# Patient Record
Sex: MALE | Race: WHITE | NOT HISPANIC OR LATINO | URBAN - METROPOLITAN AREA
[De-identification: names, ages, dates, MRNs, and addresses within clinical notes are randomized per-mention and may not be internally consistent; named-entity substitution may affect disease eponyms.]

---

## 2017-06-22 ENCOUNTER — INPATIENT (INPATIENT)
Facility: HOSPITAL | Age: 77
LOS: 0 days | Discharge: HOME | End: 2017-06-23
Attending: SURGERY | Admitting: SURGERY
Payer: MEDICARE

## 2017-06-22 VITALS — WEIGHT: 162.92 LBS | HEIGHT: 66 IN

## 2017-06-22 DIAGNOSIS — I77.4 CELIAC ARTERY COMPRESSION SYNDROME: Chronic | ICD-10-CM

## 2017-06-22 LAB
ALBUMIN SERPL ELPH-MCNC: 4 G/DL — SIGNIFICANT CHANGE UP (ref 3.3–5)
ALP SERPL-CCNC: 74 U/L — SIGNIFICANT CHANGE UP (ref 40–120)
ALT FLD-CCNC: 66 U/L — SIGNIFICANT CHANGE UP (ref 12–78)
ANION GAP SERPL CALC-SCNC: 7 MMOL/L — SIGNIFICANT CHANGE UP (ref 5–17)
APTT BLD: 27.9 SEC — SIGNIFICANT CHANGE UP (ref 27.5–37.4)
AST SERPL-CCNC: 44 U/L — HIGH (ref 15–37)
BASOPHILS # BLD AUTO: 0.1 K/UL — SIGNIFICANT CHANGE UP (ref 0–0.2)
BASOPHILS NFR BLD AUTO: 0.5 % — SIGNIFICANT CHANGE UP (ref 0–2)
BILIRUB SERPL-MCNC: 0.5 MG/DL — SIGNIFICANT CHANGE UP (ref 0.2–1.2)
BLD GP AB SCN SERPL QL: SIGNIFICANT CHANGE UP
BUN SERPL-MCNC: 26 MG/DL — HIGH (ref 7–23)
CALCIUM SERPL-MCNC: 8.8 MG/DL — SIGNIFICANT CHANGE UP (ref 8.5–10.1)
CHLORIDE SERPL-SCNC: 101 MMOL/L — SIGNIFICANT CHANGE UP (ref 96–108)
CO2 SERPL-SCNC: 31 MMOL/L — SIGNIFICANT CHANGE UP (ref 22–31)
CREAT SERPL-MCNC: 1.26 MG/DL — SIGNIFICANT CHANGE UP (ref 0.5–1.3)
EOSINOPHIL # BLD AUTO: 0.1 K/UL — SIGNIFICANT CHANGE UP (ref 0–0.5)
EOSINOPHIL NFR BLD AUTO: 0.5 % — SIGNIFICANT CHANGE UP (ref 0–6)
GLUCOSE SERPL-MCNC: 138 MG/DL — HIGH (ref 70–99)
HCT VFR BLD CALC: 45.9 % — SIGNIFICANT CHANGE UP (ref 39–50)
HGB BLD-MCNC: 15.9 G/DL — SIGNIFICANT CHANGE UP (ref 13–17)
INR BLD: 1 RATIO — SIGNIFICANT CHANGE UP (ref 0.88–1.16)
LIDOCAIN IGE QN: 102 U/L — SIGNIFICANT CHANGE UP (ref 73–393)
LYMPHOCYTES # BLD AUTO: 17.1 % — SIGNIFICANT CHANGE UP (ref 13–44)
LYMPHOCYTES # BLD AUTO: 2.5 K/UL — SIGNIFICANT CHANGE UP (ref 1–3.3)
MCHC RBC-ENTMCNC: 31.4 PG — SIGNIFICANT CHANGE UP (ref 27–34)
MCHC RBC-ENTMCNC: 34.6 GM/DL — SIGNIFICANT CHANGE UP (ref 32–36)
MCV RBC AUTO: 90.7 FL — SIGNIFICANT CHANGE UP (ref 80–100)
MONOCYTES # BLD AUTO: 0.8 K/UL — SIGNIFICANT CHANGE UP (ref 0–0.9)
MONOCYTES NFR BLD AUTO: 5.6 % — SIGNIFICANT CHANGE UP (ref 2–14)
NEUTROPHILS # BLD AUTO: 11.1 K/UL — HIGH (ref 1.8–7.4)
NEUTROPHILS NFR BLD AUTO: 76.4 % — SIGNIFICANT CHANGE UP (ref 43–77)
PLATELET # BLD AUTO: 237 K/UL — SIGNIFICANT CHANGE UP (ref 150–400)
POTASSIUM SERPL-MCNC: 3.3 MMOL/L — LOW (ref 3.5–5.3)
POTASSIUM SERPL-SCNC: 3.3 MMOL/L — LOW (ref 3.5–5.3)
PROT SERPL-MCNC: 7.4 GM/DL — SIGNIFICANT CHANGE UP (ref 6–8.3)
PROTHROM AB SERPL-ACNC: 10.8 SEC — SIGNIFICANT CHANGE UP (ref 9.8–12.7)
RBC # BLD: 5.06 M/UL — SIGNIFICANT CHANGE UP (ref 4.2–5.8)
RBC # FLD: 11.9 % — SIGNIFICANT CHANGE UP (ref 10.3–14.5)
SODIUM SERPL-SCNC: 139 MMOL/L — SIGNIFICANT CHANGE UP (ref 135–145)
TYPE + AB SCN PNL BLD: SIGNIFICANT CHANGE UP
WBC # BLD: 14.5 K/UL — HIGH (ref 3.8–10.5)
WBC # FLD AUTO: 14.5 K/UL — HIGH (ref 3.8–10.5)

## 2017-06-22 PROCEDURE — 93010 ELECTROCARDIOGRAM REPORT: CPT

## 2017-06-22 PROCEDURE — 74022 RADEX COMPL AQT ABD SERIES: CPT | Mod: 26

## 2017-06-22 PROCEDURE — 74177 CT ABD & PELVIS W/CONTRAST: CPT | Mod: 26

## 2017-06-22 PROCEDURE — 99285 EMERGENCY DEPT VISIT HI MDM: CPT

## 2017-06-22 RX ORDER — METOPROLOL TARTRATE 50 MG
5 TABLET ORAL EVERY 6 HOURS
Qty: 0 | Refills: 0 | Status: DISCONTINUED | OUTPATIENT
Start: 2017-06-22 | End: 2017-06-23

## 2017-06-22 RX ORDER — HYDROMORPHONE HYDROCHLORIDE 2 MG/ML
0.5 INJECTION INTRAMUSCULAR; INTRAVENOUS; SUBCUTANEOUS ONCE
Qty: 0 | Refills: 0 | Status: DISCONTINUED | OUTPATIENT
Start: 2017-06-22 | End: 2017-06-22

## 2017-06-22 RX ORDER — METOPROLOL TARTRATE 50 MG
1 TABLET ORAL
Qty: 0 | Refills: 0 | COMMUNITY

## 2017-06-22 RX ORDER — ONDANSETRON 8 MG/1
4 TABLET, FILM COATED ORAL ONCE
Qty: 0 | Refills: 0 | Status: DISCONTINUED | OUTPATIENT
Start: 2017-06-22 | End: 2017-06-22

## 2017-06-22 RX ORDER — HEPARIN SODIUM 5000 [USP'U]/ML
5000 INJECTION INTRAVENOUS; SUBCUTANEOUS EVERY 12 HOURS
Qty: 0 | Refills: 0 | Status: DISCONTINUED | OUTPATIENT
Start: 2017-06-22 | End: 2017-06-23

## 2017-06-22 RX ORDER — AMLODIPINE BESYLATE 2.5 MG/1
1 TABLET ORAL
Qty: 0 | Refills: 0 | COMMUNITY

## 2017-06-22 RX ORDER — CLOPIDOGREL BISULFATE 75 MG/1
1 TABLET, FILM COATED ORAL
Qty: 0 | Refills: 0 | COMMUNITY

## 2017-06-22 RX ORDER — RANITIDINE HYDROCHLORIDE 150 MG/1
1 TABLET, FILM COATED ORAL
Qty: 0 | Refills: 0 | COMMUNITY

## 2017-06-22 RX ORDER — POTASSIUM CHLORIDE 20 MEQ
10 PACKET (EA) ORAL
Qty: 0 | Refills: 0 | Status: COMPLETED | OUTPATIENT
Start: 2017-06-22 | End: 2017-06-22

## 2017-06-22 RX ORDER — ONDANSETRON 8 MG/1
4 TABLET, FILM COATED ORAL ONCE
Qty: 0 | Refills: 0 | Status: COMPLETED | OUTPATIENT
Start: 2017-06-22 | End: 2017-06-22

## 2017-06-22 RX ORDER — SODIUM CHLORIDE 9 MG/ML
1000 INJECTION INTRAMUSCULAR; INTRAVENOUS; SUBCUTANEOUS ONCE
Qty: 0 | Refills: 0 | Status: COMPLETED | OUTPATIENT
Start: 2017-06-22 | End: 2017-06-22

## 2017-06-22 RX ORDER — EZETIMIBE 10 MG/1
1 TABLET ORAL
Qty: 0 | Refills: 0 | COMMUNITY

## 2017-06-22 RX ORDER — PITAVASTATIN CALCIUM 1.04 MG/1
1 TABLET, FILM COATED ORAL
Qty: 0 | Refills: 0 | COMMUNITY

## 2017-06-22 RX ORDER — MORPHINE SULFATE 50 MG/1
2 CAPSULE, EXTENDED RELEASE ORAL EVERY 4 HOURS
Qty: 0 | Refills: 0 | Status: DISCONTINUED | OUTPATIENT
Start: 2017-06-22 | End: 2017-06-23

## 2017-06-22 RX ORDER — VALSARTAN 80 MG/1
1 TABLET ORAL
Qty: 0 | Refills: 0 | COMMUNITY

## 2017-06-22 RX ORDER — PANTOPRAZOLE SODIUM 20 MG/1
40 TABLET, DELAYED RELEASE ORAL DAILY
Qty: 0 | Refills: 0 | Status: DISCONTINUED | OUTPATIENT
Start: 2017-06-22 | End: 2017-06-23

## 2017-06-22 RX ORDER — ASPIRIN/CALCIUM CARB/MAGNESIUM 324 MG
1 TABLET ORAL
Qty: 0 | Refills: 0 | COMMUNITY

## 2017-06-22 RX ORDER — AMITRIPTYLINE HCL 25 MG
1 TABLET ORAL
Qty: 0 | Refills: 0 | COMMUNITY

## 2017-06-22 RX ORDER — DEXTROSE MONOHYDRATE, SODIUM CHLORIDE, AND POTASSIUM CHLORIDE 50; .745; 4.5 G/1000ML; G/1000ML; G/1000ML
1000 INJECTION, SOLUTION INTRAVENOUS
Qty: 0 | Refills: 0 | Status: DISCONTINUED | OUTPATIENT
Start: 2017-06-22 | End: 2017-06-23

## 2017-06-22 RX ADMIN — ONDANSETRON 4 MILLIGRAM(S): 8 TABLET, FILM COATED ORAL at 15:00

## 2017-06-22 RX ADMIN — HYDROMORPHONE HYDROCHLORIDE 0.5 MILLIGRAM(S): 2 INJECTION INTRAMUSCULAR; INTRAVENOUS; SUBCUTANEOUS at 17:30

## 2017-06-22 RX ADMIN — Medication 5 MILLIGRAM(S): at 23:18

## 2017-06-22 RX ADMIN — MORPHINE SULFATE 2 MILLIGRAM(S): 50 CAPSULE, EXTENDED RELEASE ORAL at 19:53

## 2017-06-22 RX ADMIN — HYDROMORPHONE HYDROCHLORIDE 0.5 MILLIGRAM(S): 2 INJECTION INTRAMUSCULAR; INTRAVENOUS; SUBCUTANEOUS at 15:30

## 2017-06-22 RX ADMIN — HYDROMORPHONE HYDROCHLORIDE 0.5 MILLIGRAM(S): 2 INJECTION INTRAMUSCULAR; INTRAVENOUS; SUBCUTANEOUS at 16:16

## 2017-06-22 RX ADMIN — HYDROMORPHONE HYDROCHLORIDE 0.5 MILLIGRAM(S): 2 INJECTION INTRAMUSCULAR; INTRAVENOUS; SUBCUTANEOUS at 18:00

## 2017-06-22 RX ADMIN — Medication 100 MILLIEQUIVALENT(S): at 23:18

## 2017-06-22 RX ADMIN — HYDROMORPHONE HYDROCHLORIDE 0.5 MILLIGRAM(S): 2 INJECTION INTRAMUSCULAR; INTRAVENOUS; SUBCUTANEOUS at 16:46

## 2017-06-22 RX ADMIN — DEXTROSE MONOHYDRATE, SODIUM CHLORIDE, AND POTASSIUM CHLORIDE 100 MILLILITER(S): 50; .745; 4.5 INJECTION, SOLUTION INTRAVENOUS at 22:38

## 2017-06-22 RX ADMIN — HYDROMORPHONE HYDROCHLORIDE 0.5 MILLIGRAM(S): 2 INJECTION INTRAMUSCULAR; INTRAVENOUS; SUBCUTANEOUS at 15:00

## 2017-06-22 RX ADMIN — SODIUM CHLORIDE 2000 MILLILITER(S): 9 INJECTION INTRAMUSCULAR; INTRAVENOUS; SUBCUTANEOUS at 15:00

## 2017-06-22 NOTE — ED STATDOCS - PROGRESS NOTE DETAILS
KEY Mann: Patient has been seen, evaluated and orders have been written by the attending in intake. Patient is stable.  I will follow up the results of orders written and I will continue to evaluate/observe the patient. KEY Mann: pt c/o returning pain, will order more pain meds KEY Mann: dr frias called ed and spoke with dr coleman. requests us to call dr montes de oca (sx) about abdominal xray results showing ileus vs. sbo. dr montes de oca paged KEY Mann: dr montes de oca aware of consult

## 2017-06-22 NOTE — H&P ADULT - ASSESSMENT
77 y/o with SBO  NPO, IVF  -NGtube  -Serial abdominal exam  -f/U ABX in AM  - f/u Lab AM  -above plan discussed with Dr. montes de oca

## 2017-06-22 NOTE — ED STATDOCS - MEDICAL DECISION MAKING DETAILS
76yr old male, middle abdomen pain, appears very uncomfortable, concern for bowel obstruction given pts history, plan for screening XR for free air, CT, labs, preop w/u

## 2017-06-22 NOTE — ED ADULT NURSE NOTE - OBJECTIVE STATEMENT
Pt reports abdominal pain since this AM, no N/V/D, relief with belching. Denies any urinary F/U/D. Pt states "I was out playing a round of golf and at the 5th hole, I had Pt reports abdominal pain since this AM, no N/V/D, relief with belching. Denies any urinary F/U/D. Pt states "I was out playing a round of golf and at the 5th hole, I had to come in, I have had a partial bowel obstruction in the past"

## 2017-06-22 NOTE — ED STATDOCS - ATTENDING CONTRIBUTION TO CARE
I, Kevin Schreiber, performed the initial face to face bedside interview with this patient regarding history of present illness, review of symptoms and relevant past medical, social and family history.  I completed an independent physical examination.  I was the initial provider who evaluated this patient. I have signed out the follow up of any pending tests (i.e. labs, radiological studies) to the ACP.  I have communicated the patient’s plan of care and disposition with the ACP.  The history, relevant review of systems, past medical and surgical history, medical decision making, and physical examination was documented by the scribe in my presence and I attest to the accuracy of the documentation.

## 2017-06-22 NOTE — H&P ADULT - HISTORY OF PRESENT ILLNESS
75 yo M h/o partial bowel obstruction (self resolved), transurethral prostate resection, hernias, sent in from PCP Dr. Mathew office for intermittent, worsening abd pain since this morning. Pt reports some discomfort last night, had eggs and guevara on a bagel with coffee this morning, played golf from 8-11am during which the discomfort and pain gradually increased. Pt states the pain would build up, go away, and come back. +Radiation to lower back, abd distension. Denies n/v, dysuria, frequency. Last normal BM this morning.

## 2017-06-22 NOTE — ED STATDOCS - CONSTITUTIONAL, MLM
normal... well appearing, well nourished, mild distress secondary to pain. Pt unable to find a comfortable position.

## 2017-06-22 NOTE — ED ADULT NURSE REASSESSMENT NOTE - COMFORT CARE
repositioned/side rails up/before being taken to unit, patient was given a blanket and tissue and pillow. hourly rounding completed
warm blanket provided/plan of care explained/wait time explained

## 2017-06-22 NOTE — H&P ADULT - NSHPLABSRESULTS_GEN_ALL_CORE
EXAM:  CT ABDOMEN AND PELVIS OC IC          Small bowel obstruction with transition zone in the right pelvis   involving the distal ileum just proximal to the terminal ileum.

## 2017-06-22 NOTE — ED STATDOCS - GASTROINTESTINAL, MLM
abdomen soft, + and non-distended. Bowel sounds present. No cva tenderness. abdomen soft, +tenderness to umbilicus without mass, mildly distended, +tympanic. Bowel sounds present. No cva tenderness.

## 2017-06-22 NOTE — H&P ADULT - NSHPREVIEWOFSYSTEMS_GEN_ALL_CORE
· CONSTITUTIONAL: no fever and no chills.  · CARDIOVASCULAR: normal rate and rhythm, no chest pain and no edema.  · RESPIRATORY: no chest pain, no cough, and no shortness of breath.  · GASTROINTESTINAL:  · Gastrointestinal [+]: ABDOMINAL PAIN, distension  · Gastrointestinal [-]: no nausea  · MUSCULOSKELETAL: no back pain, no gout, no musculoskeletal pain, no neck pain, and no weakness.  · ROS STATEMENT: all other ROS negative except as per HPI

## 2017-06-22 NOTE — H&P ADULT - NSHPPHYSICALEXAM_GEN_ALL_CORE
· CONSTITUTIONAL: well appearing, well nourished, mild distress secondary to pain. Pt unable to find a comfortable position.  · EYES: clear bilaterally.  Pupils equal, round, and reactive to light.  · ENMT: Nasal mucosa clear.  Mouth with normal mucosa  Throat has no vesicles, no oropharyngeal exudates and uvula is midline.  · CARDIAC: normal rate, regular rhythm, and no murmur.  · RESPIRATORY: breath sounds clear and equal bilaterally.  · GASTROINTESTINAL: abdomen soft, +tenderness to umbilicus without mass, mildly distended, +tympanic. Bowel sounds present. No cva tenderness.  · MUSCULOSKELETAL: range of motion is not limited and there is no muscle tenderness.  · NEUROLOGICAL: sensation is normal and strength is normal.  · SKIN: skin normal color for race, warm, dry and intact.

## 2017-06-23 VITALS — DIASTOLIC BLOOD PRESSURE: 55 MMHG | SYSTOLIC BLOOD PRESSURE: 121 MMHG | HEART RATE: 69 BPM

## 2017-06-23 LAB
ANION GAP SERPL CALC-SCNC: 5 MMOL/L — SIGNIFICANT CHANGE UP (ref 5–17)
APPEARANCE UR: CLEAR — SIGNIFICANT CHANGE UP
BILIRUB UR-MCNC: NEGATIVE — SIGNIFICANT CHANGE UP
BUN SERPL-MCNC: 17 MG/DL — SIGNIFICANT CHANGE UP (ref 7–23)
CALCIUM SERPL-MCNC: 7.9 MG/DL — LOW (ref 8.5–10.1)
CHLORIDE SERPL-SCNC: 104 MMOL/L — SIGNIFICANT CHANGE UP (ref 96–108)
CO2 SERPL-SCNC: 30 MMOL/L — SIGNIFICANT CHANGE UP (ref 22–31)
COLOR SPEC: YELLOW — SIGNIFICANT CHANGE UP
CREAT SERPL-MCNC: 0.85 MG/DL — SIGNIFICANT CHANGE UP (ref 0.5–1.3)
DIFF PNL FLD: (no result)
GLUCOSE SERPL-MCNC: 130 MG/DL — HIGH (ref 70–99)
GLUCOSE UR QL: NEGATIVE MG/DL — SIGNIFICANT CHANGE UP
HCT VFR BLD CALC: 43.5 % — SIGNIFICANT CHANGE UP (ref 39–50)
HGB BLD-MCNC: 14.7 G/DL — SIGNIFICANT CHANGE UP (ref 13–17)
KETONES UR-MCNC: NEGATIVE — SIGNIFICANT CHANGE UP
LEUKOCYTE ESTERASE UR-ACNC: NEGATIVE — SIGNIFICANT CHANGE UP
MCHC RBC-ENTMCNC: 30.8 PG — SIGNIFICANT CHANGE UP (ref 27–34)
MCHC RBC-ENTMCNC: 33.8 GM/DL — SIGNIFICANT CHANGE UP (ref 32–36)
MCV RBC AUTO: 91.2 FL — SIGNIFICANT CHANGE UP (ref 80–100)
NITRITE UR-MCNC: NEGATIVE — SIGNIFICANT CHANGE UP
PH UR: 7 — SIGNIFICANT CHANGE UP (ref 5–8)
PLATELET # BLD AUTO: 198 K/UL — SIGNIFICANT CHANGE UP (ref 150–400)
POTASSIUM SERPL-MCNC: 3.8 MMOL/L — SIGNIFICANT CHANGE UP (ref 3.5–5.3)
POTASSIUM SERPL-SCNC: 3.8 MMOL/L — SIGNIFICANT CHANGE UP (ref 3.5–5.3)
PROT UR-MCNC: NEGATIVE MG/DL — SIGNIFICANT CHANGE UP
RBC # BLD: 4.77 M/UL — SIGNIFICANT CHANGE UP (ref 4.2–5.8)
RBC # FLD: 12.5 % — SIGNIFICANT CHANGE UP (ref 10.3–14.5)
RBC CASTS # UR COMP ASSIST: (no result) /HPF (ref 0–4)
SODIUM SERPL-SCNC: 139 MMOL/L — SIGNIFICANT CHANGE UP (ref 135–145)
SP GR SPEC: 1 — LOW (ref 1.01–1.02)
UROBILINOGEN FLD QL: NEGATIVE MG/DL — SIGNIFICANT CHANGE UP
WBC # BLD: 8.9 K/UL — SIGNIFICANT CHANGE UP (ref 3.8–10.5)
WBC # FLD AUTO: 8.9 K/UL — SIGNIFICANT CHANGE UP (ref 3.8–10.5)
WBC UR QL: NEGATIVE — SIGNIFICANT CHANGE UP

## 2017-06-23 PROCEDURE — 74022 RADEX COMPL AQT ABD SERIES: CPT | Mod: 26

## 2017-06-23 PROCEDURE — 93010 ELECTROCARDIOGRAM REPORT: CPT

## 2017-06-23 RX ADMIN — PANTOPRAZOLE SODIUM 40 MILLIGRAM(S): 20 TABLET, DELAYED RELEASE ORAL at 12:49

## 2017-06-23 RX ADMIN — Medication 100 MILLIEQUIVALENT(S): at 00:46

## 2017-06-23 RX ADMIN — Medication 5 MILLIGRAM(S): at 12:49

## 2017-06-23 RX ADMIN — HEPARIN SODIUM 5000 UNIT(S): 5000 INJECTION INTRAVENOUS; SUBCUTANEOUS at 05:35

## 2017-06-23 RX ADMIN — Medication 100 MILLIEQUIVALENT(S): at 01:59

## 2017-06-23 NOTE — PROGRESS NOTE ADULT - SUBJECTIVE AND OBJECTIVE BOX
Subjective: HD#1    Objective: sbo    Heent: N/C, AT PER no scleral icterus, No JVD  Pul: clear  Cor: RRR  Abdomen: soft, normal BS.   Extremities: without edema, motor/sensory intact    06-22    139  |  101  |  26<H>  ----------------------------<  138<H>  3.3<L>   |  31  |  1.26    Ca    8.8      22 Jun 2017 14:56    TPro  7.4  /  Alb  4.0  /  TBili  0.5  /  DBili  x   /  AST  44<H>  /  ALT  66  /  AlkPhos  74  06-22                            15.9   14.5  )-----------( 237      ( 22 Jun 2017 14:56 )             45.9

## 2017-06-23 NOTE — ASU DISCHARGE PLAN (ADULT/PEDIATRIC). - COMMENTS
Unable to print discharge instruction on [facility], instructions printed by Jackelyn Melara RN from [facility]. Discharging nurse is Honey Estrella RN

## 2017-06-23 NOTE — ASU DISCHARGE PLAN (ADULT/PEDIATRIC). - MEDICATION SUMMARY - MEDICATIONS TO TAKE
I will START or STAY ON the medications listed below when I get home from the hospital:    aspirin 81 mg oral tablet  -- 1 tab(s) by mouth once a day  -- Indication: For Essential hypertension    valsartan 320 mg oral tablet  -- 1 tab(s) by mouth once a day  -- Indication: For Essential hypertension    amitriptyline 10 mg oral tablet  -- 1 tab(s) by mouth once a day (at bedtime)  -- Indication: For Essential hypertension    Livalo 2 mg oral tablet  -- 1 tab(s) by mouth once a day (at bedtime)  -- Indication: For Essential hypertension    Zetia 10 mg oral tablet  -- 1 tab(s) by mouth once a day (at bedtime)  -- Indication: For Essential hypertension    Plavix 75 mg oral tablet  -- 1 tab(s) by mouth once a day  -- Indication: For Essential hypertension    metoprolol tartrate 25 mg oral tablet  -- 1 tab(s) by mouth 2 times a day  -- Indication: For Essential hypertension    amLODIPine 10 mg oral tablet  -- 1 tab(s) by mouth once a day  -- Indication: For Essential hypertension    hydroCHLOROthiazide 12.5 mg oral tablet  -- 1 tab(s) by mouth once a day  -- Indication: For Essential hypertension    Zantac 150 oral tablet  -- 1 tab(s) by mouth 2 times a day  -- Indication: For Essential hypertension    melatonin 3 mg oral tablet  -- 1 tab(s) by mouth once (at bedtime)  -- Indication: For Essential hypertension    melatonin 1 mg oral tablet  -- 1 tab(s) by mouth once a day (at bedtime) if needed  -- Indication: For Essential hypertension    Co Q-10  -- 200 milligram(s) by mouth once a day  -- Indication: For Essential hypertension    multivitamin  --     -- Indication: For Essential hypertension    multivitamin  -- 1 tab(s) by mouth once a day  -- Indication: For Essential hypertension    Calcium 500+D oral tablet, chewable  -- 1 tab(s) by mouth once a day  -- Indication: For Essential hypertension    Vitamin D3 1000 intl units oral capsule  -- 1 cap(s) by mouth once a day  -- Indication: For Essential hypertension

## 2017-06-25 DIAGNOSIS — Z98.890 OTHER SPECIFIED POSTPROCEDURAL STATES: Chronic | ICD-10-CM

## 2017-07-05 DIAGNOSIS — R10.9 UNSPECIFIED ABDOMINAL PAIN: ICD-10-CM

## 2017-07-05 DIAGNOSIS — K56.60 UNSPECIFIED INTESTINAL OBSTRUCTION: ICD-10-CM

## 2017-07-05 DIAGNOSIS — I10 ESSENTIAL (PRIMARY) HYPERTENSION: ICD-10-CM

## 2017-07-05 DIAGNOSIS — I70.90 UNSPECIFIED ATHEROSCLEROSIS: ICD-10-CM

## 2017-07-05 DIAGNOSIS — Z87.891 PERSONAL HISTORY OF NICOTINE DEPENDENCE: ICD-10-CM

## 2017-11-09 ENCOUNTER — OUTPATIENT (OUTPATIENT)
Dept: OUTPATIENT SERVICES | Facility: HOSPITAL | Age: 77
LOS: 1 days | Discharge: HOME | End: 2017-11-09
Payer: MEDICARE

## 2017-11-09 DIAGNOSIS — K62.1 RECTAL POLYP: ICD-10-CM

## 2017-11-09 DIAGNOSIS — D12.4 BENIGN NEOPLASM OF DESCENDING COLON: ICD-10-CM

## 2017-11-09 DIAGNOSIS — Z80.0 FAMILY HISTORY OF MALIGNANT NEOPLASM OF DIGESTIVE ORGANS: ICD-10-CM

## 2017-11-09 DIAGNOSIS — D12.0 BENIGN NEOPLASM OF CECUM: ICD-10-CM

## 2017-11-09 DIAGNOSIS — K64.8 OTHER HEMORRHOIDS: ICD-10-CM

## 2017-11-09 DIAGNOSIS — K57.30 DIVERTICULOSIS OF LARGE INTESTINE WITHOUT PERFORATION OR ABSCESS WITHOUT BLEEDING: ICD-10-CM

## 2017-11-09 DIAGNOSIS — Z09 ENCOUNTER FOR FOLLOW-UP EXAMINATION AFTER COMPLETED TREATMENT FOR CONDITIONS OTHER THAN MALIGNANT NEOPLASM: ICD-10-CM

## 2017-11-09 DIAGNOSIS — Z01.818 ENCOUNTER FOR OTHER PREPROCEDURAL EXAMINATION: ICD-10-CM

## 2017-11-09 DIAGNOSIS — I77.4 CELIAC ARTERY COMPRESSION SYNDROME: Chronic | ICD-10-CM

## 2017-11-09 DIAGNOSIS — Z86.010 PERSONAL HISTORY OF COLONIC POLYPS: ICD-10-CM

## 2017-11-09 DIAGNOSIS — Z98.890 OTHER SPECIFIED POSTPROCEDURAL STATES: Chronic | ICD-10-CM

## 2017-11-09 LAB
ANION GAP SERPL CALC-SCNC: 4 MMOL/L — LOW (ref 5–17)
BASOPHILS # BLD AUTO: 0.1 K/UL — SIGNIFICANT CHANGE UP (ref 0–0.2)
BASOPHILS NFR BLD AUTO: 1.7 % — SIGNIFICANT CHANGE UP (ref 0–2)
BUN SERPL-MCNC: 23 MG/DL — SIGNIFICANT CHANGE UP (ref 7–23)
CALCIUM SERPL-MCNC: 9.1 MG/DL — SIGNIFICANT CHANGE UP (ref 8.5–10.1)
CHLORIDE SERPL-SCNC: 102 MMOL/L — SIGNIFICANT CHANGE UP (ref 96–108)
CO2 SERPL-SCNC: 33 MMOL/L — HIGH (ref 22–31)
CREAT SERPL-MCNC: 0.94 MG/DL — SIGNIFICANT CHANGE UP (ref 0.5–1.3)
EOSINOPHIL # BLD AUTO: 0.4 K/UL — SIGNIFICANT CHANGE UP (ref 0–0.5)
EOSINOPHIL NFR BLD AUTO: 6.7 % — HIGH (ref 0–6)
GLUCOSE SERPL-MCNC: 71 MG/DL — SIGNIFICANT CHANGE UP (ref 70–99)
HCT VFR BLD CALC: 47.7 % — SIGNIFICANT CHANGE UP (ref 39–50)
HGB BLD-MCNC: 16.5 G/DL — SIGNIFICANT CHANGE UP (ref 13–17)
LYMPHOCYTES # BLD AUTO: 1.9 K/UL — SIGNIFICANT CHANGE UP (ref 1–3.3)
LYMPHOCYTES # BLD AUTO: 32.5 % — SIGNIFICANT CHANGE UP (ref 13–44)
MCHC RBC-ENTMCNC: 31.8 PG — SIGNIFICANT CHANGE UP (ref 27–34)
MCHC RBC-ENTMCNC: 34.6 GM/DL — SIGNIFICANT CHANGE UP (ref 32–36)
MCV RBC AUTO: 91.6 FL — SIGNIFICANT CHANGE UP (ref 80–100)
MONOCYTES # BLD AUTO: 0.6 K/UL — SIGNIFICANT CHANGE UP (ref 0–0.9)
MONOCYTES NFR BLD AUTO: 10.3 % — SIGNIFICANT CHANGE UP (ref 2–14)
NEUTROPHILS # BLD AUTO: 2.8 K/UL — SIGNIFICANT CHANGE UP (ref 1.8–7.4)
NEUTROPHILS NFR BLD AUTO: 48.8 % — SIGNIFICANT CHANGE UP (ref 43–77)
PLATELET # BLD AUTO: 215 K/UL — SIGNIFICANT CHANGE UP (ref 150–400)
POTASSIUM SERPL-MCNC: 4.1 MMOL/L — SIGNIFICANT CHANGE UP (ref 3.5–5.3)
POTASSIUM SERPL-SCNC: 4.1 MMOL/L — SIGNIFICANT CHANGE UP (ref 3.5–5.3)
RBC # BLD: 5.2 M/UL — SIGNIFICANT CHANGE UP (ref 4.2–5.8)
RBC # FLD: 11.7 % — SIGNIFICANT CHANGE UP (ref 10.3–14.5)
SODIUM SERPL-SCNC: 139 MMOL/L — SIGNIFICANT CHANGE UP (ref 135–145)
WBC # BLD: 5.8 K/UL — SIGNIFICANT CHANGE UP (ref 3.8–10.5)
WBC # FLD AUTO: 5.8 K/UL — SIGNIFICANT CHANGE UP (ref 3.8–10.5)

## 2017-11-09 PROCEDURE — 93010 ELECTROCARDIOGRAM REPORT: CPT

## 2017-11-16 ENCOUNTER — OUTPATIENT (OUTPATIENT)
Dept: OUTPATIENT SERVICES | Facility: HOSPITAL | Age: 77
LOS: 1 days | Discharge: HOME | End: 2017-11-16
Payer: MEDICARE

## 2017-11-16 ENCOUNTER — RESULT REVIEW (OUTPATIENT)
Age: 77
End: 2017-11-16

## 2017-11-16 VITALS
SYSTOLIC BLOOD PRESSURE: 145 MMHG | WEIGHT: 175.05 LBS | TEMPERATURE: 97 F | RESPIRATION RATE: 14 BRPM | HEIGHT: 65 IN | HEART RATE: 67 BPM | OXYGEN SATURATION: 97 % | DIASTOLIC BLOOD PRESSURE: 75 MMHG

## 2017-11-16 DIAGNOSIS — Z98.890 OTHER SPECIFIED POSTPROCEDURAL STATES: Chronic | ICD-10-CM

## 2017-11-16 DIAGNOSIS — I77.4 CELIAC ARTERY COMPRESSION SYNDROME: Chronic | ICD-10-CM

## 2017-11-16 DIAGNOSIS — Z90.79 ACQUIRED ABSENCE OF OTHER GENITAL ORGAN(S): Chronic | ICD-10-CM

## 2017-11-16 PROCEDURE — 88305 TISSUE EXAM BY PATHOLOGIST: CPT | Mod: 26

## 2017-11-16 RX ORDER — LANOLIN ALCOHOL/MO/W.PET/CERES
1 CREAM (GRAM) TOPICAL
Qty: 0 | Refills: 0 | COMMUNITY

## 2017-11-16 RX ORDER — SODIUM CHLORIDE 9 MG/ML
1000 INJECTION INTRAMUSCULAR; INTRAVENOUS; SUBCUTANEOUS
Qty: 0 | Refills: 0 | Status: DISCONTINUED | OUTPATIENT
Start: 2017-11-16 | End: 2017-12-01

## 2017-11-16 NOTE — ASU PATIENT PROFILE, ADULT - PMH
Diverticulitis    Essential hypertension    SBO (small bowel obstruction) Atypical chest pain    Diverticulitis    Essential hypertension    SBO (small bowel obstruction)

## 2017-11-16 NOTE — ASU PATIENT PROFILE, ADULT - PSH
Celiac artery stenosis    H/O bilateral inguinal hernia repair  1996  H/O hemorrhoidectomy  2006  H/O transurethral resection of prostate  2000  History of carotid endarterectomy

## 2017-11-17 LAB — SURGICAL PATHOLOGY FINAL REPORT - CH: SIGNIFICANT CHANGE UP

## 2017-11-21 DIAGNOSIS — Z09 ENCOUNTER FOR FOLLOW-UP EXAMINATION AFTER COMPLETED TREATMENT FOR CONDITIONS OTHER THAN MALIGNANT NEOPLASM: ICD-10-CM

## 2017-11-21 DIAGNOSIS — Z80.0 FAMILY HISTORY OF MALIGNANT NEOPLASM OF DIGESTIVE ORGANS: ICD-10-CM

## 2017-11-21 DIAGNOSIS — K57.30 DIVERTICULOSIS OF LARGE INTESTINE WITHOUT PERFORATION OR ABSCESS WITHOUT BLEEDING: ICD-10-CM

## 2017-11-21 DIAGNOSIS — D12.0 BENIGN NEOPLASM OF CECUM: ICD-10-CM

## 2017-11-21 DIAGNOSIS — Z87.891 PERSONAL HISTORY OF NICOTINE DEPENDENCE: ICD-10-CM

## 2017-11-21 DIAGNOSIS — K21.9 GASTRO-ESOPHAGEAL REFLUX DISEASE WITHOUT ESOPHAGITIS: ICD-10-CM

## 2017-11-21 DIAGNOSIS — I10 ESSENTIAL (PRIMARY) HYPERTENSION: ICD-10-CM

## 2017-11-21 DIAGNOSIS — K62.1 RECTAL POLYP: ICD-10-CM

## 2017-11-21 DIAGNOSIS — Z79.02 LONG TERM (CURRENT) USE OF ANTITHROMBOTICS/ANTIPLATELETS: ICD-10-CM

## 2017-11-21 DIAGNOSIS — D12.4 BENIGN NEOPLASM OF DESCENDING COLON: ICD-10-CM

## 2017-11-21 DIAGNOSIS — Z86.010 PERSONAL HISTORY OF COLONIC POLYPS: ICD-10-CM

## 2017-11-21 DIAGNOSIS — N28.1 CYST OF KIDNEY, ACQUIRED: ICD-10-CM

## 2017-11-21 DIAGNOSIS — K64.8 OTHER HEMORRHOIDS: ICD-10-CM

## 2018-02-05 ENCOUNTER — RESULT REVIEW (OUTPATIENT)
Age: 78
End: 2018-02-05

## 2018-02-05 ENCOUNTER — OUTPATIENT (OUTPATIENT)
Dept: OUTPATIENT SERVICES | Facility: HOSPITAL | Age: 78
LOS: 1 days | Discharge: HOME | End: 2018-02-05
Payer: MEDICARE

## 2018-02-05 VITALS
SYSTOLIC BLOOD PRESSURE: 142 MMHG | WEIGHT: 167.99 LBS | HEIGHT: 66 IN | OXYGEN SATURATION: 98 % | RESPIRATION RATE: 15 BRPM | TEMPERATURE: 97 F | DIASTOLIC BLOOD PRESSURE: 76 MMHG | HEART RATE: 54 BPM

## 2018-02-05 DIAGNOSIS — Z98.890 OTHER SPECIFIED POSTPROCEDURAL STATES: Chronic | ICD-10-CM

## 2018-02-05 DIAGNOSIS — I77.4 CELIAC ARTERY COMPRESSION SYNDROME: Chronic | ICD-10-CM

## 2018-02-05 DIAGNOSIS — Z90.79 ACQUIRED ABSENCE OF OTHER GENITAL ORGAN(S): Chronic | ICD-10-CM

## 2018-02-05 PROCEDURE — 88312 SPECIAL STAINS GROUP 1: CPT | Mod: 26

## 2018-02-05 PROCEDURE — 88313 SPECIAL STAINS GROUP 2: CPT | Mod: 26

## 2018-02-05 PROCEDURE — 88305 TISSUE EXAM BY PATHOLOGIST: CPT | Mod: 26

## 2018-02-05 RX ORDER — SODIUM CHLORIDE 9 MG/ML
1000 INJECTION INTRAMUSCULAR; INTRAVENOUS; SUBCUTANEOUS
Qty: 0 | Refills: 0 | Status: DISCONTINUED | OUTPATIENT
Start: 2018-02-05 | End: 2018-02-20

## 2018-02-05 RX ADMIN — SODIUM CHLORIDE 75 MILLILITER(S): 9 INJECTION INTRAMUSCULAR; INTRAVENOUS; SUBCUTANEOUS at 09:47

## 2018-02-06 LAB — SURGICAL PATHOLOGY FINAL REPORT - CH: SIGNIFICANT CHANGE UP

## 2018-02-10 DIAGNOSIS — K20.9 ESOPHAGITIS, UNSPECIFIED: ICD-10-CM

## 2018-02-10 DIAGNOSIS — R10.13 EPIGASTRIC PAIN: ICD-10-CM

## 2018-02-10 DIAGNOSIS — K29.50 UNSPECIFIED CHRONIC GASTRITIS WITHOUT BLEEDING: ICD-10-CM

## 2018-02-10 DIAGNOSIS — K44.9 DIAPHRAGMATIC HERNIA WITHOUT OBSTRUCTION OR GANGRENE: ICD-10-CM

## 2018-02-10 DIAGNOSIS — Z88.2 ALLERGY STATUS TO SULFONAMIDES: ICD-10-CM

## 2018-02-10 DIAGNOSIS — I10 ESSENTIAL (PRIMARY) HYPERTENSION: ICD-10-CM

## 2019-04-16 ENCOUNTER — INPATIENT (INPATIENT)
Facility: HOSPITAL | Age: 79
LOS: 1 days | Discharge: HOME | End: 2019-04-18
Attending: SURGERY | Admitting: SURGERY
Payer: MEDICARE

## 2019-04-16 ENCOUNTER — RESULT REVIEW (OUTPATIENT)
Age: 79
End: 2019-04-16

## 2019-04-16 VITALS — WEIGHT: 164.91 LBS | HEIGHT: 66 IN

## 2019-04-16 DIAGNOSIS — Z98.890 OTHER SPECIFIED POSTPROCEDURAL STATES: Chronic | ICD-10-CM

## 2019-04-16 DIAGNOSIS — Z90.79 ACQUIRED ABSENCE OF OTHER GENITAL ORGAN(S): Chronic | ICD-10-CM

## 2019-04-16 DIAGNOSIS — I77.4 CELIAC ARTERY COMPRESSION SYNDROME: Chronic | ICD-10-CM

## 2019-04-16 DIAGNOSIS — K35.80 UNSPECIFIED ACUTE APPENDICITIS: ICD-10-CM

## 2019-04-16 PROBLEM — K57.92 DIVERTICULITIS OF INTESTINE, PART UNSPECIFIED, WITHOUT PERFORATION OR ABSCESS WITHOUT BLEEDING: Chronic | Status: ACTIVE | Noted: 2017-11-16

## 2019-04-16 PROBLEM — I10 ESSENTIAL (PRIMARY) HYPERTENSION: Chronic | Status: ACTIVE | Noted: 2017-06-22

## 2019-04-16 LAB
ALBUMIN SERPL ELPH-MCNC: 3.5 G/DL — SIGNIFICANT CHANGE UP (ref 3.3–5)
ALP SERPL-CCNC: 46 U/L — SIGNIFICANT CHANGE UP (ref 40–120)
ALT FLD-CCNC: 38 U/L — SIGNIFICANT CHANGE UP (ref 12–78)
ANION GAP SERPL CALC-SCNC: 10 MMOL/L — SIGNIFICANT CHANGE UP (ref 5–17)
APPEARANCE UR: CLEAR — SIGNIFICANT CHANGE UP
AST SERPL-CCNC: 19 U/L — SIGNIFICANT CHANGE UP (ref 15–37)
BACTERIA # UR AUTO: ABNORMAL
BILIRUB SERPL-MCNC: 1.1 MG/DL — SIGNIFICANT CHANGE UP (ref 0.2–1.2)
BILIRUB UR-MCNC: NEGATIVE — SIGNIFICANT CHANGE UP
BUN SERPL-MCNC: 24 MG/DL — HIGH (ref 7–23)
CALCIUM SERPL-MCNC: 8.6 MG/DL — SIGNIFICANT CHANGE UP (ref 8.5–10.1)
CHLORIDE SERPL-SCNC: 101 MMOL/L — SIGNIFICANT CHANGE UP (ref 96–108)
CO2 SERPL-SCNC: 27 MMOL/L — SIGNIFICANT CHANGE UP (ref 22–31)
COLOR SPEC: YELLOW — SIGNIFICANT CHANGE UP
CREAT SERPL-MCNC: 1.6 MG/DL — HIGH (ref 0.5–1.3)
DIFF PNL FLD: NEGATIVE — SIGNIFICANT CHANGE UP
EPI CELLS # UR: NEGATIVE — SIGNIFICANT CHANGE UP
GLUCOSE SERPL-MCNC: 150 MG/DL — HIGH (ref 70–99)
GLUCOSE UR QL: NEGATIVE MG/DL — SIGNIFICANT CHANGE UP
HCT VFR BLD CALC: 42.9 % — SIGNIFICANT CHANGE UP (ref 39–50)
HGB BLD-MCNC: 15.1 G/DL — SIGNIFICANT CHANGE UP (ref 13–17)
KETONES UR-MCNC: NEGATIVE — SIGNIFICANT CHANGE UP
LACTATE SERPL-SCNC: 1 MMOL/L — SIGNIFICANT CHANGE UP (ref 0.7–2)
LACTATE SERPL-SCNC: 2.4 MMOL/L — HIGH (ref 0.7–2)
LEUKOCYTE ESTERASE UR-ACNC: NEGATIVE — SIGNIFICANT CHANGE UP
LIDOCAIN IGE QN: 71 U/L — LOW (ref 73–393)
MANUAL SMEAR VERIFICATION: SIGNIFICANT CHANGE UP
MCHC RBC-ENTMCNC: 31.7 PG — SIGNIFICANT CHANGE UP (ref 27–34)
MCHC RBC-ENTMCNC: 35.2 GM/DL — SIGNIFICANT CHANGE UP (ref 32–36)
MCV RBC AUTO: 90.1 FL — SIGNIFICANT CHANGE UP (ref 80–100)
NITRITE UR-MCNC: NEGATIVE — SIGNIFICANT CHANGE UP
PH UR: 7 — SIGNIFICANT CHANGE UP (ref 5–8)
PLAT MORPH BLD: NORMAL — SIGNIFICANT CHANGE UP
PLATELET # BLD AUTO: 156 K/UL — SIGNIFICANT CHANGE UP (ref 150–400)
PLATELET COUNT - ESTIMATE: NORMAL — SIGNIFICANT CHANGE UP
POTASSIUM SERPL-MCNC: 3.5 MMOL/L — SIGNIFICANT CHANGE UP (ref 3.5–5.3)
POTASSIUM SERPL-SCNC: 3.5 MMOL/L — SIGNIFICANT CHANGE UP (ref 3.5–5.3)
PROT SERPL-MCNC: 6.5 GM/DL — SIGNIFICANT CHANGE UP (ref 6–8.3)
PROT UR-MCNC: 15 MG/DL
RBC # BLD: 4.76 M/UL — SIGNIFICANT CHANGE UP (ref 4.2–5.8)
RBC # FLD: 12.6 % — SIGNIFICANT CHANGE UP (ref 10.3–14.5)
RBC BLD AUTO: NORMAL — SIGNIFICANT CHANGE UP
RBC CASTS # UR COMP ASSIST: NEGATIVE /HPF — SIGNIFICANT CHANGE UP (ref 0–4)
SODIUM SERPL-SCNC: 138 MMOL/L — SIGNIFICANT CHANGE UP (ref 135–145)
SP GR SPEC: 1 — LOW (ref 1.01–1.02)
UROBILINOGEN FLD QL: NEGATIVE MG/DL — SIGNIFICANT CHANGE UP
WBC # BLD: 16.57 K/UL — HIGH (ref 3.8–10.5)
WBC # FLD AUTO: 16.57 K/UL — HIGH (ref 3.8–10.5)
WBC UR QL: NEGATIVE — SIGNIFICANT CHANGE UP

## 2019-04-16 PROCEDURE — 88304 TISSUE EXAM BY PATHOLOGIST: CPT | Mod: 26

## 2019-04-16 PROCEDURE — 93010 ELECTROCARDIOGRAM REPORT: CPT

## 2019-04-16 PROCEDURE — 99285 EMERGENCY DEPT VISIT HI MDM: CPT

## 2019-04-16 PROCEDURE — 74177 CT ABD & PELVIS W/CONTRAST: CPT | Mod: 26

## 2019-04-16 PROCEDURE — 99221 1ST HOSP IP/OBS SF/LOW 40: CPT

## 2019-04-16 RX ORDER — SODIUM CHLORIDE 9 MG/ML
1000 INJECTION INTRAMUSCULAR; INTRAVENOUS; SUBCUTANEOUS
Qty: 0 | Refills: 0 | Status: DISCONTINUED | OUTPATIENT
Start: 2019-04-16 | End: 2019-04-16

## 2019-04-16 RX ORDER — HYDROCHLOROTHIAZIDE 25 MG
12.5 TABLET ORAL ONCE
Qty: 0 | Refills: 0 | Status: COMPLETED | OUTPATIENT
Start: 2019-04-16 | End: 2019-04-16

## 2019-04-16 RX ORDER — ATORVASTATIN CALCIUM 80 MG/1
10 TABLET, FILM COATED ORAL AT BEDTIME
Qty: 0 | Refills: 0 | Status: DISCONTINUED | OUTPATIENT
Start: 2019-04-16 | End: 2019-04-18

## 2019-04-16 RX ORDER — AMLODIPINE BESYLATE 2.5 MG/1
10 TABLET ORAL ONCE
Qty: 0 | Refills: 0 | Status: COMPLETED | OUTPATIENT
Start: 2019-04-16 | End: 2019-04-16

## 2019-04-16 RX ORDER — HEPARIN SODIUM 5000 [USP'U]/ML
5000 INJECTION INTRAVENOUS; SUBCUTANEOUS EVERY 12 HOURS
Qty: 0 | Refills: 0 | Status: DISCONTINUED | OUTPATIENT
Start: 2019-04-16 | End: 2019-04-18

## 2019-04-16 RX ORDER — HYDROMORPHONE HYDROCHLORIDE 2 MG/ML
0.5 INJECTION INTRAMUSCULAR; INTRAVENOUS; SUBCUTANEOUS EVERY 4 HOURS
Qty: 0 | Refills: 0 | Status: DISCONTINUED | OUTPATIENT
Start: 2019-04-16 | End: 2019-04-16

## 2019-04-16 RX ORDER — ONDANSETRON 8 MG/1
4 TABLET, FILM COATED ORAL EVERY 6 HOURS
Qty: 0 | Refills: 0 | Status: DISCONTINUED | OUTPATIENT
Start: 2019-04-16 | End: 2019-04-16

## 2019-04-16 RX ORDER — OXYCODONE AND ACETAMINOPHEN 5; 325 MG/1; MG/1
1 TABLET ORAL EVERY 4 HOURS
Qty: 0 | Refills: 0 | Status: DISCONTINUED | OUTPATIENT
Start: 2019-04-16 | End: 2019-04-16

## 2019-04-16 RX ORDER — MEPERIDINE HYDROCHLORIDE 50 MG/ML
12.5 INJECTION INTRAMUSCULAR; INTRAVENOUS; SUBCUTANEOUS
Qty: 0 | Refills: 0 | Status: DISCONTINUED | OUTPATIENT
Start: 2019-04-16 | End: 2019-04-16

## 2019-04-16 RX ORDER — METOPROLOL TARTRATE 50 MG
25 TABLET ORAL
Qty: 0 | Refills: 0 | Status: DISCONTINUED | OUTPATIENT
Start: 2019-04-16 | End: 2019-04-18

## 2019-04-16 RX ORDER — ONDANSETRON 8 MG/1
4 TABLET, FILM COATED ORAL ONCE
Qty: 0 | Refills: 0 | Status: DISCONTINUED | OUTPATIENT
Start: 2019-04-16 | End: 2019-04-16

## 2019-04-16 RX ORDER — AMITRIPTYLINE HCL 25 MG
10 TABLET ORAL ONCE
Qty: 0 | Refills: 0 | Status: COMPLETED | OUTPATIENT
Start: 2019-04-16 | End: 2019-04-16

## 2019-04-16 RX ORDER — SODIUM CHLORIDE 9 MG/ML
1000 INJECTION INTRAMUSCULAR; INTRAVENOUS; SUBCUTANEOUS ONCE
Qty: 0 | Refills: 0 | Status: COMPLETED | OUTPATIENT
Start: 2019-04-16 | End: 2019-04-16

## 2019-04-16 RX ORDER — SODIUM CHLORIDE 9 MG/ML
1000 INJECTION, SOLUTION INTRAVENOUS
Qty: 0 | Refills: 0 | Status: DISCONTINUED | OUTPATIENT
Start: 2019-04-16 | End: 2019-04-18

## 2019-04-16 RX ORDER — AMLODIPINE BESYLATE 2.5 MG/1
10 TABLET ORAL DAILY
Qty: 0 | Refills: 0 | Status: DISCONTINUED | OUTPATIENT
Start: 2019-04-16 | End: 2019-04-18

## 2019-04-16 RX ORDER — OXYCODONE HYDROCHLORIDE 5 MG/1
5 TABLET ORAL ONCE
Qty: 0 | Refills: 0 | Status: DISCONTINUED | OUTPATIENT
Start: 2019-04-16 | End: 2019-04-16

## 2019-04-16 RX ORDER — CEFOTETAN DISODIUM 1 G
2 VIAL (EA) INJECTION ONCE
Qty: 0 | Refills: 0 | Status: COMPLETED | OUTPATIENT
Start: 2019-04-17 | End: 2019-04-17

## 2019-04-16 RX ORDER — HYDROMORPHONE HYDROCHLORIDE 2 MG/ML
0.5 INJECTION INTRAMUSCULAR; INTRAVENOUS; SUBCUTANEOUS
Qty: 0 | Refills: 0 | Status: DISCONTINUED | OUTPATIENT
Start: 2019-04-16 | End: 2019-04-18

## 2019-04-16 RX ORDER — CLOPIDOGREL BISULFATE 75 MG/1
75 TABLET, FILM COATED ORAL DAILY
Qty: 0 | Refills: 0 | Status: DISCONTINUED | OUTPATIENT
Start: 2019-04-16 | End: 2019-04-18

## 2019-04-16 RX ORDER — ASPIRIN/CALCIUM CARB/MAGNESIUM 324 MG
81 TABLET ORAL DAILY
Qty: 0 | Refills: 0 | Status: DISCONTINUED | OUTPATIENT
Start: 2019-04-16 | End: 2019-04-18

## 2019-04-16 RX ORDER — ACETAMINOPHEN 500 MG
1000 TABLET ORAL ONCE
Qty: 0 | Refills: 0 | Status: COMPLETED | OUTPATIENT
Start: 2019-04-16 | End: 2019-04-16

## 2019-04-16 RX ORDER — HYDROMORPHONE HYDROCHLORIDE 2 MG/ML
0.5 INJECTION INTRAMUSCULAR; INTRAVENOUS; SUBCUTANEOUS
Qty: 0 | Refills: 0 | Status: DISCONTINUED | OUTPATIENT
Start: 2019-04-16 | End: 2019-04-16

## 2019-04-16 RX ORDER — PIPERACILLIN AND TAZOBACTAM 4; .5 G/20ML; G/20ML
3.38 INJECTION, POWDER, LYOPHILIZED, FOR SOLUTION INTRAVENOUS ONCE
Qty: 0 | Refills: 0 | Status: COMPLETED | OUTPATIENT
Start: 2019-04-16 | End: 2019-04-16

## 2019-04-16 RX ORDER — OXYCODONE AND ACETAMINOPHEN 5; 325 MG/1; MG/1
2 TABLET ORAL EVERY 6 HOURS
Qty: 0 | Refills: 0 | Status: DISCONTINUED | OUTPATIENT
Start: 2019-04-16 | End: 2019-04-16

## 2019-04-16 RX ORDER — LANOLIN ALCOHOL/MO/W.PET/CERES
5 CREAM (GRAM) TOPICAL AT BEDTIME
Qty: 0 | Refills: 0 | Status: DISCONTINUED | OUTPATIENT
Start: 2019-04-16 | End: 2019-04-18

## 2019-04-16 RX ORDER — HYDROMORPHONE HYDROCHLORIDE 2 MG/ML
1 INJECTION INTRAMUSCULAR; INTRAVENOUS; SUBCUTANEOUS EVERY 4 HOURS
Qty: 0 | Refills: 0 | Status: DISCONTINUED | OUTPATIENT
Start: 2019-04-16 | End: 2019-04-18

## 2019-04-16 RX ORDER — AMITRIPTYLINE HCL 25 MG
10 TABLET ORAL AT BEDTIME
Qty: 0 | Refills: 0 | Status: DISCONTINUED | OUTPATIENT
Start: 2019-04-16 | End: 2019-04-18

## 2019-04-16 RX ORDER — METOPROLOL TARTRATE 50 MG
25 TABLET ORAL
Qty: 0 | Refills: 0 | Status: DISCONTINUED | OUTPATIENT
Start: 2019-04-16 | End: 2019-04-16

## 2019-04-16 RX ADMIN — Medication 10 MILLIGRAM(S): at 23:38

## 2019-04-16 RX ADMIN — HYDROMORPHONE HYDROCHLORIDE 0.5 MILLIGRAM(S): 2 INJECTION INTRAMUSCULAR; INTRAVENOUS; SUBCUTANEOUS at 21:50

## 2019-04-16 RX ADMIN — Medication 5 MILLIGRAM(S): at 23:38

## 2019-04-16 RX ADMIN — HYDROMORPHONE HYDROCHLORIDE 0.5 MILLIGRAM(S): 2 INJECTION INTRAMUSCULAR; INTRAVENOUS; SUBCUTANEOUS at 23:23

## 2019-04-16 RX ADMIN — SODIUM CHLORIDE 2000 MILLILITER(S): 9 INJECTION INTRAMUSCULAR; INTRAVENOUS; SUBCUTANEOUS at 14:53

## 2019-04-16 RX ADMIN — SODIUM CHLORIDE 125 MILLILITER(S): 9 INJECTION, SOLUTION INTRAVENOUS at 23:47

## 2019-04-16 RX ADMIN — PIPERACILLIN AND TAZOBACTAM 3.38 GRAM(S): 4; .5 INJECTION, POWDER, LYOPHILIZED, FOR SOLUTION INTRAVENOUS at 18:00

## 2019-04-16 RX ADMIN — Medication 400 MILLIGRAM(S): at 21:20

## 2019-04-16 RX ADMIN — SODIUM CHLORIDE 1000 MILLILITER(S): 9 INJECTION INTRAMUSCULAR; INTRAVENOUS; SUBCUTANEOUS at 16:25

## 2019-04-16 RX ADMIN — SODIUM CHLORIDE 1000 MILLILITER(S): 9 INJECTION INTRAMUSCULAR; INTRAVENOUS; SUBCUTANEOUS at 16:08

## 2019-04-16 RX ADMIN — HYDROMORPHONE HYDROCHLORIDE 0.5 MILLIGRAM(S): 2 INJECTION INTRAMUSCULAR; INTRAVENOUS; SUBCUTANEOUS at 21:42

## 2019-04-16 RX ADMIN — SODIUM CHLORIDE 100 MILLILITER(S): 9 INJECTION INTRAMUSCULAR; INTRAVENOUS; SUBCUTANEOUS at 21:21

## 2019-04-16 RX ADMIN — SODIUM CHLORIDE 1000 MILLILITER(S): 9 INJECTION INTRAMUSCULAR; INTRAVENOUS; SUBCUTANEOUS at 15:40

## 2019-04-16 RX ADMIN — PIPERACILLIN AND TAZOBACTAM 200 GRAM(S): 4; .5 INJECTION, POWDER, LYOPHILIZED, FOR SOLUTION INTRAVENOUS at 17:12

## 2019-04-16 NOTE — H&P ADULT - NSHPSOCIALHISTORY_GEN_ALL_CORE
pt states he stopped smoking in 1975 but used to smoke 2 packs per day.  He drinks socially, usually on the weekends. Denies drug use.

## 2019-04-16 NOTE — H&P ADULT - NSICDXPASTMEDICALHX_GEN_ALL_CORE_FT
PAST MEDICAL HISTORY:  Atypical chest pain     Diverticulitis     Essential hypertension     SBO (small bowel obstruction)

## 2019-04-16 NOTE — ED STATDOCS - ATTENDING CONTRIBUTION TO CARE
I, Glenda Martinez MD,  performed the initial face to face bedside interview with this patient regarding history of present illness, review of symptoms and relevant past medical, social and family history.  I completed an independent physical examination.  I was the initial provider who evaluated this patient. I have signed out the follow up of any pending tests (i.e. labs, radiological studies) to the ACP.  I have communicated the patient’s plan of care and disposition with the ACP.  The history, relevant review of systems, past medical and surgical history, medical decision making, and physical examination was documented by the scribe in my presence and I attest to the accuracy of the documentation.

## 2019-04-16 NOTE — H&P ADULT - NSHPLABSRESULTS_GEN_ALL_CORE
15.1   16.57 )-----------( 156      ( 2019 14:41 )  Vital Signs Last 24 Hrs  T(C): 36.7 (2019 18:28), Max: 36.8 (2019 14:23)  T(F): 98.1 (2019 18:28), Max: 98.3 (2019 14:23)  HR: 68 (2019 18:28) (67 - 68)  BP: 134/76 (2019 18:28) (107/62 - 134/76)  BP(mean): --  RR: 15 (2019 18:28) (15 - 16)  SpO2: 96% (2019 18:28) (95% - 96%)               42.9         138  |  101  |  24<H>  ----------------------------<  150<H>  3.5   |  27  |  1.60<H>    Ca    8.6      2019 14:41    TPro  6.5  /  Alb  3.5  /  TBili  1.1  /  DBili  x   /  AST  19  /  ALT  38  /  AlkPhos  46        Urinalysis Basic - ( 2019 16:45 )    Color: Yellow / Appearance: Clear / S.005 / pH: x  Gluc: x / Ketone: Negative  / Bili: Negative / Urobili: Negative mg/dL   Blood: x / Protein: 15 mg/dL / Nitrite: Negative   Leuk Esterase: Negative / RBC: Negative /HPF / WBC Negative   Sq Epi: x / Non Sq Epi: Negative / Bacteria: Occasional    < from: CT Abdomen and Pelvis w/ IV Cont (19 @ 16:38) >    XAM:  CT ABDOMEN AND PELVIS IC                            PROCEDURE DATE:  2019          INTERPRETATION:  Clinical information: Lower abdominal pain. Rule out   small bowel obstruction.    COMPARISON: 2017    PROCEDURE:   CT of the Abdomen and Pelvis was performed with intravenous contrast.   Intravenous contrast: 90 ml Omnipaque 350. 10 ml discarded.  Oral contrast: Not administered.  Sagittal and coronal reformats were performed.    FINDINGS:    LOWER CHEST: Coronary artery calcifications.    LIVER: Scattered hepatic cysts and subcentimeter hypodense lesions which   are too small for accurate characterization, however without significant   change.  SPLEEN: Within normal limits.  PANCREAS: Within normal limits.  GALLBLADDER: Within normal limits.  BILE DUCTS: Normal caliber.  ADRENALS: Within normal limits.  KIDNEYS/URETERS: No mass, stone or hydronephrosis. Bilateral renal cysts.    RETROPERITONEUM: No lymphadenopathy.    VESSELS:  Atherosclerotic arterial calcifications. Mild ectasia of the   infrarenal aorta, measuring up to 2.4 cm.    BOWEL: Appendix is mildly enlarged and has a thickened and abnormally   enhancing wall. There is no periappendiceal fluid collection or   extraluminal gas. No bowel obstruction. Colonic diverticulosis without   diverticulitis.  PERITONEUM: No ascites or pneumoperitoneum.    REPRODUCTIVE ORGANS: Probable TURP defect at the base of the prostate..  BLADDER: Within normal limits.    ABDOMINAL WALL: Bilateral groin hernia repair with mesh.  BONES: No acute bony abnormality.    IMPRESSION: Acute uncomplicated appendicitis    Findings discussed with KEY Lares on 2019, 4:50 PM.        OMAR ORTEGA   This document has been electronically signed. [id number]    < end of copied text >

## 2019-04-16 NOTE — H&P ADULT - HISTORY OF PRESENT ILLNESS
78 year old male with a h/o HTN, 4 bowel obstructions, celiac artery stent, right endarterectomy on Plavix presents to the ED today for abdominal pain which started today at 3AM and woke him up from sleep.  He states his pain was a 5/10, he tried taking ibuprofen this morning with no relief and went to see his PMD who advised him to come to the emergency room for further evaluation. He denies fevers, chills, nausea, vomiting, cp, sob, sick contacts, recent travel.

## 2019-04-16 NOTE — ED STATDOCS - PROGRESS NOTE DETAILS
Patient seen and evaluated, ED attending note and orders reviewed, will continue with patient follow up and care -Jourdan Lares PA-C 79 yo M presents wit abd pain. Pt states he woke up at 0300 with lower abdominal pain moderate in intensity, pain feels similar to prior SBOs but not as severe. Pain as since resolved. Also reports feeling lightheaded. Denies fever/chills, n/v/d, CP, SOB, rectal bleeding or other complaints at this time. -Jourdan Lares PA-C Pt with acute appendicitis, call placed to Dr. montes de oca. -Jourdan Lares PA-C Discussed case with Dr. Arambula who will evaluate pt. -Jourdan Lares PA-C Pt evaluated by Dr. montes de oca, who will admit pt. -Jourdan Lares PA-C

## 2019-04-16 NOTE — ED ADULT NURSE NOTE - CHPI ED NUR SYMPTOMS NEG
no blood in stool/no burning urination/no chills/no diarrhea/no abdominal distension/no vomiting/no dysuria

## 2019-04-16 NOTE — ED STATDOCS - OBJECTIVE STATEMENT
79 y/o male with a PMHx of sbo, diverticulitis, HTN presents to the ED sent in from urgent care c/o abd pain since last night. +decreased flatus.  +dizziness described as lightheadedness Denies fever, nausea, HA, CP. Pt notes he has hx of frequent SBO's in the past, have always been self resolving and not requiring surgery. In 2017, pt had celiac artery stent placed. Surgeon- Dr. Arambula. PMD- Dr. Mathew.

## 2019-04-16 NOTE — ED ADULT TRIAGE NOTE - CHIEF COMPLAINT QUOTE
lower abdominal pain that started last night, pain intermittently radiates to the back.  Patient has a h/o SBO.  He denies fever/chills, N/V/D.  He states he was at MD office and started to feel lightheaded, sent to ER for CT scan and further evaluation.  Majority of his symptoms have resolved.

## 2019-04-16 NOTE — H&P ADULT - NSICDXPASTSURGICALHX_GEN_ALL_CORE_FT
PAST SURGICAL HISTORY:  Celiac artery stenosis     H/O bilateral inguinal hernia repair 1996    H/O hemorrhoidectomy 2006    H/O transurethral resection of prostate 2000    History of carotid endarterectomy

## 2019-04-16 NOTE — H&P ADULT - NSHPPHYSICALEXAM_GEN_ALL_CORE
A&Ox3, NAD, pt sitting comfortably on the side of the bed  HEENT: EOMI, PERRLA, no JVD noted, neck supple  Cardiac: normal s1s2, RRR  Pulm: CTA bl  Abd: soft, tender in RLQ, nonsitended  LE: no calf tenderness or edema bl

## 2019-04-16 NOTE — H&P ADULT - ASSESSMENT
79 y/o male with acute appendicitis  -NPO  -IVF  -OR for laparoscopic appendectomy  pt seen and plan d/w Dr. Arambula

## 2019-04-17 LAB
ANION GAP SERPL CALC-SCNC: 9 MMOL/L — SIGNIFICANT CHANGE UP (ref 5–17)
BASOPHILS # BLD AUTO: 0.03 K/UL — SIGNIFICANT CHANGE UP (ref 0–0.2)
BASOPHILS NFR BLD AUTO: 0.2 % — SIGNIFICANT CHANGE UP (ref 0–2)
BUN SERPL-MCNC: 19 MG/DL — SIGNIFICANT CHANGE UP (ref 7–23)
CALCIUM SERPL-MCNC: 7.7 MG/DL — LOW (ref 8.5–10.1)
CHLORIDE SERPL-SCNC: 104 MMOL/L — SIGNIFICANT CHANGE UP (ref 96–108)
CO2 SERPL-SCNC: 28 MMOL/L — SIGNIFICANT CHANGE UP (ref 22–31)
CREAT SERPL-MCNC: 1.61 MG/DL — HIGH (ref 0.5–1.3)
EOSINOPHIL # BLD AUTO: 0 K/UL — SIGNIFICANT CHANGE UP (ref 0–0.5)
EOSINOPHIL NFR BLD AUTO: 0 % — SIGNIFICANT CHANGE UP (ref 0–6)
GLUCOSE SERPL-MCNC: 97 MG/DL — SIGNIFICANT CHANGE UP (ref 70–99)
HCT VFR BLD CALC: 40.2 % — SIGNIFICANT CHANGE UP (ref 39–50)
HGB BLD-MCNC: 13.7 G/DL — SIGNIFICANT CHANGE UP (ref 13–17)
IMM GRANULOCYTES NFR BLD AUTO: 0.6 % — SIGNIFICANT CHANGE UP (ref 0–1.5)
LYMPHOCYTES # BLD AUTO: 0.41 K/UL — LOW (ref 1–3.3)
LYMPHOCYTES # BLD AUTO: 2.5 % — LOW (ref 13–44)
MCHC RBC-ENTMCNC: 31.6 PG — SIGNIFICANT CHANGE UP (ref 27–34)
MCHC RBC-ENTMCNC: 34.1 GM/DL — SIGNIFICANT CHANGE UP (ref 32–36)
MCV RBC AUTO: 92.8 FL — SIGNIFICANT CHANGE UP (ref 80–100)
MONOCYTES # BLD AUTO: 0.77 K/UL — SIGNIFICANT CHANGE UP (ref 0–0.9)
MONOCYTES NFR BLD AUTO: 4.6 % — SIGNIFICANT CHANGE UP (ref 2–14)
NEUTROPHILS # BLD AUTO: 15.26 K/UL — HIGH (ref 1.8–7.4)
NEUTROPHILS NFR BLD AUTO: 92.1 % — HIGH (ref 43–77)
NRBC # BLD: 0 /100 WBCS — SIGNIFICANT CHANGE UP (ref 0–0)
NRBC # BLD: 0 /100 WBCS — SIGNIFICANT CHANGE UP (ref 0–0)
PLATELET # BLD AUTO: 125 K/UL — LOW (ref 150–400)
POTASSIUM SERPL-MCNC: 3.5 MMOL/L — SIGNIFICANT CHANGE UP (ref 3.5–5.3)
POTASSIUM SERPL-SCNC: 3.5 MMOL/L — SIGNIFICANT CHANGE UP (ref 3.5–5.3)
RBC # BLD: 4.33 M/UL — SIGNIFICANT CHANGE UP (ref 4.2–5.8)
RBC # FLD: 13.1 % — SIGNIFICANT CHANGE UP (ref 10.3–14.5)
SODIUM SERPL-SCNC: 141 MMOL/L — SIGNIFICANT CHANGE UP (ref 135–145)
WBC # BLD: 8 K/UL — SIGNIFICANT CHANGE UP (ref 3.8–10.5)
WBC # FLD AUTO: 8 K/UL — SIGNIFICANT CHANGE UP (ref 3.8–10.5)

## 2019-04-17 PROCEDURE — 51702 INSERT TEMP BLADDER CATH: CPT

## 2019-04-17 RX ORDER — FAMOTIDINE 10 MG/ML
20 INJECTION INTRAVENOUS DAILY
Qty: 0 | Refills: 0 | Status: DISCONTINUED | OUTPATIENT
Start: 2019-04-17 | End: 2019-04-18

## 2019-04-17 RX ORDER — LIDOCAINE 4 G/100G
1 CREAM TOPICAL ONCE
Qty: 0 | Refills: 0 | Status: COMPLETED | OUTPATIENT
Start: 2019-04-17 | End: 2019-04-17

## 2019-04-17 RX ORDER — LOSARTAN POTASSIUM 100 MG/1
100 TABLET, FILM COATED ORAL DAILY
Qty: 0 | Refills: 0 | Status: DISCONTINUED | OUTPATIENT
Start: 2019-04-17 | End: 2019-04-18

## 2019-04-17 RX ADMIN — HYDROMORPHONE HYDROCHLORIDE 0.5 MILLIGRAM(S): 2 INJECTION INTRAMUSCULAR; INTRAVENOUS; SUBCUTANEOUS at 22:32

## 2019-04-17 RX ADMIN — HYDROMORPHONE HYDROCHLORIDE 1 MILLIGRAM(S): 2 INJECTION INTRAMUSCULAR; INTRAVENOUS; SUBCUTANEOUS at 13:00

## 2019-04-17 RX ADMIN — LIDOCAINE 1 APPLICATION(S): 4 CREAM TOPICAL at 09:19

## 2019-04-17 RX ADMIN — FAMOTIDINE 20 MILLIGRAM(S): 10 INJECTION INTRAVENOUS at 12:46

## 2019-04-17 RX ADMIN — Medication 10 MILLIGRAM(S): at 21:45

## 2019-04-17 RX ADMIN — HYDROMORPHONE HYDROCHLORIDE 0.5 MILLIGRAM(S): 2 INJECTION INTRAMUSCULAR; INTRAVENOUS; SUBCUTANEOUS at 22:46

## 2019-04-17 RX ADMIN — HYDROMORPHONE HYDROCHLORIDE 0.5 MILLIGRAM(S): 2 INJECTION INTRAMUSCULAR; INTRAVENOUS; SUBCUTANEOUS at 06:29

## 2019-04-17 RX ADMIN — Medication 25 MILLIGRAM(S): at 17:03

## 2019-04-17 RX ADMIN — LOSARTAN POTASSIUM 100 MILLIGRAM(S): 100 TABLET, FILM COATED ORAL at 12:46

## 2019-04-17 RX ADMIN — HYDROMORPHONE HYDROCHLORIDE 0.5 MILLIGRAM(S): 2 INJECTION INTRAMUSCULAR; INTRAVENOUS; SUBCUTANEOUS at 17:01

## 2019-04-17 RX ADMIN — HYDROMORPHONE HYDROCHLORIDE 0.5 MILLIGRAM(S): 2 INJECTION INTRAMUSCULAR; INTRAVENOUS; SUBCUTANEOUS at 09:33

## 2019-04-17 RX ADMIN — HYDROMORPHONE HYDROCHLORIDE 1 MILLIGRAM(S): 2 INJECTION INTRAMUSCULAR; INTRAVENOUS; SUBCUTANEOUS at 12:45

## 2019-04-17 RX ADMIN — Medication 5 MILLIGRAM(S): at 22:32

## 2019-04-17 RX ADMIN — Medication 100 GRAM(S): at 01:26

## 2019-04-17 RX ADMIN — CLOPIDOGREL BISULFATE 75 MILLIGRAM(S): 75 TABLET, FILM COATED ORAL at 12:46

## 2019-04-17 RX ADMIN — HEPARIN SODIUM 5000 UNIT(S): 5000 INJECTION INTRAVENOUS; SUBCUTANEOUS at 17:02

## 2019-04-17 RX ADMIN — SODIUM CHLORIDE 125 MILLILITER(S): 9 INJECTION, SOLUTION INTRAVENOUS at 19:51

## 2019-04-17 RX ADMIN — HYDROMORPHONE HYDROCHLORIDE 0.5 MILLIGRAM(S): 2 INJECTION INTRAMUSCULAR; INTRAVENOUS; SUBCUTANEOUS at 09:18

## 2019-04-17 RX ADMIN — HEPARIN SODIUM 5000 UNIT(S): 5000 INJECTION INTRAVENOUS; SUBCUTANEOUS at 06:13

## 2019-04-17 RX ADMIN — SODIUM CHLORIDE 125 MILLILITER(S): 9 INJECTION, SOLUTION INTRAVENOUS at 11:05

## 2019-04-17 RX ADMIN — HYDROMORPHONE HYDROCHLORIDE 0.5 MILLIGRAM(S): 2 INJECTION INTRAMUSCULAR; INTRAVENOUS; SUBCUTANEOUS at 17:16

## 2019-04-17 RX ADMIN — AMLODIPINE BESYLATE 10 MILLIGRAM(S): 2.5 TABLET ORAL at 12:46

## 2019-04-17 NOTE — PROGRESS NOTE ADULT - SUBJECTIVE AND OBJECTIVE BOX
Subjective: POD#1    Objective: appi    Heent: N/C, AT PER no scleral icterus, No JVD  Pul: clear  Cor: RRR  Abdomen: soft, normal BS. Wound - clean  Extremities: without edema, motor/sensory intact    04-16    138  |  101  |  24<H>  ----------------------------<  150<H>  3.5   |  27  |  1.60<H>    Ca    8.6      16 Apr 2019 14:41    TPro  6.5  /  Alb  3.5  /  TBili  1.1  /  DBili  x   /  AST  19  /  ALT  38  /  AlkPhos  46  04-16                            15.1   16.57 )-----------( 156      ( 16 Apr 2019 14:41 )             42.9

## 2019-04-18 ENCOUNTER — TRANSCRIPTION ENCOUNTER (OUTPATIENT)
Age: 79
End: 2019-04-18

## 2019-04-18 VITALS
TEMPERATURE: 98 F | SYSTOLIC BLOOD PRESSURE: 109 MMHG | RESPIRATION RATE: 16 BRPM | DIASTOLIC BLOOD PRESSURE: 54 MMHG | HEART RATE: 63 BPM

## 2019-04-18 LAB — SURGICAL PATHOLOGY STUDY: SIGNIFICANT CHANGE UP

## 2019-04-18 RX ORDER — TAMSULOSIN HYDROCHLORIDE 0.4 MG/1
1 CAPSULE ORAL
Qty: 15 | Refills: 0
Start: 2019-04-18 | End: 2019-05-02

## 2019-04-18 RX ADMIN — HYDROMORPHONE HYDROCHLORIDE 0.5 MILLIGRAM(S): 2 INJECTION INTRAMUSCULAR; INTRAVENOUS; SUBCUTANEOUS at 09:15

## 2019-04-18 RX ADMIN — CLOPIDOGREL BISULFATE 75 MILLIGRAM(S): 75 TABLET, FILM COATED ORAL at 11:26

## 2019-04-18 RX ADMIN — Medication 25 MILLIGRAM(S): at 05:10

## 2019-04-18 RX ADMIN — HYDROMORPHONE HYDROCHLORIDE 0.5 MILLIGRAM(S): 2 INJECTION INTRAMUSCULAR; INTRAVENOUS; SUBCUTANEOUS at 06:09

## 2019-04-18 RX ADMIN — Medication 81 MILLIGRAM(S): at 11:27

## 2019-04-18 RX ADMIN — FAMOTIDINE 20 MILLIGRAM(S): 10 INJECTION INTRAVENOUS at 11:27

## 2019-04-18 RX ADMIN — HEPARIN SODIUM 5000 UNIT(S): 5000 INJECTION INTRAVENOUS; SUBCUTANEOUS at 05:11

## 2019-04-18 RX ADMIN — AMLODIPINE BESYLATE 10 MILLIGRAM(S): 2.5 TABLET ORAL at 05:10

## 2019-04-18 RX ADMIN — SODIUM CHLORIDE 125 MILLILITER(S): 9 INJECTION, SOLUTION INTRAVENOUS at 03:59

## 2019-04-18 RX ADMIN — HYDROMORPHONE HYDROCHLORIDE 0.5 MILLIGRAM(S): 2 INJECTION INTRAMUSCULAR; INTRAVENOUS; SUBCUTANEOUS at 09:01

## 2019-04-18 RX ADMIN — HYDROMORPHONE HYDROCHLORIDE 0.5 MILLIGRAM(S): 2 INJECTION INTRAMUSCULAR; INTRAVENOUS; SUBCUTANEOUS at 05:11

## 2019-04-18 RX ADMIN — LOSARTAN POTASSIUM 100 MILLIGRAM(S): 100 TABLET, FILM COATED ORAL at 05:10

## 2019-04-18 NOTE — DISCHARGE NOTE PROVIDER - CARE PROVIDERS DIRECT ADDRESSES
,[email or web address]

## 2019-04-18 NOTE — DISCHARGE NOTE PROVIDER - HOSPITAL COURSE
Appendectomy done as an open procedure. Urinary retention on POD#1. Urology consult pending. Likely home with ferguson in place. Appendectomy done as an open procedure. Urinary retention on POD#1. F/U Dr. Jamie Garvey MD on 4/19 for caudate catheter removal. Pt going home with ferguson in place.

## 2019-04-18 NOTE — DISCHARGE NOTE PROVIDER - PROVIDER TOKENS
PROVIDER:[TOKEN:[[id number]],FOLLOWUP:[1 week]] PROVIDER:[TOKEN:[[id number]],FOLLOWUP:[1 week]],PROVIDER:[TOKEN:[[id number]],FOLLOWUP:[1-3 days]]

## 2019-04-18 NOTE — PROGRESS NOTE ADULT - SUBJECTIVE AND OBJECTIVE BOX
Subjective: POD#2    Objective: appi    Heent: N/C, AT PER no scleral icterus, No JVD  Pul: clear  Cor: RRR  Abdomen: soft, normal BS. Wound - clean  Extremities: without edema, motor/sensory intact    04-17    141  |  104  |  19  ----------------------------<  97  3.5   |  28  |  1.61<H>    Ca    7.7<L>      17 Apr 2019 07:34    TPro  6.5  /  Alb  3.5  /  TBili  1.1  /  DBili  x   /  AST  19  /  ALT  38  /  AlkPhos  46  04-16                            13.7   8.00  )-----------( 125      ( 17 Apr 2019 07:34 )             40.2

## 2019-04-18 NOTE — DISCHARGE NOTE NURSING/CASE MANAGEMENT/SOCIAL WORK - NSDCDPATPORTLINK_GEN_ALL_CORE
You can access the [facility] Patient Portal, offered by [facility], by registering with the following website: [email or web address]

## 2019-04-18 NOTE — DISCHARGE NOTE PROVIDER - CARE PROVIDER_API CALL
Silverio Arambula)  Surgery; Vascular Surgery  [address] [place] [address]  Phone: [phone]  Fax: [phone]  Follow Up Time: 1 week Silverio Arambula)  Surgery; Vascular Surgery  [address] [place] [address]  Phone: [phone]  Fax: [phone]  Follow Up Time: 1 week    Jamie Garvey)  Urology  [address] [place] [address]  Phone: [phone]  Fax: [phone]  Follow Up Time: 1-3 days

## 2019-04-23 DIAGNOSIS — I10 ESSENTIAL (PRIMARY) HYPERTENSION: ICD-10-CM

## 2019-04-23 DIAGNOSIS — K35.80 UNSPECIFIED ACUTE APPENDICITIS: ICD-10-CM

## 2019-04-23 DIAGNOSIS — Z79.02 LONG TERM (CURRENT) USE OF ANTITHROMBOTICS/ANTIPLATELETS: ICD-10-CM

## 2019-04-23 DIAGNOSIS — Z88.2 ALLERGY STATUS TO SULFONAMIDES: ICD-10-CM

## 2019-04-23 DIAGNOSIS — R10.9 UNSPECIFIED ABDOMINAL PAIN: ICD-10-CM

## 2019-04-23 DIAGNOSIS — R33.9 RETENTION OF URINE, UNSPECIFIED: ICD-10-CM

## 2019-04-23 DIAGNOSIS — Z79.82 LONG TERM (CURRENT) USE OF ASPIRIN: ICD-10-CM

## 2019-12-02 PROBLEM — C44.91 BASAL CELL CARCINOMA OF SKIN, UNSPECIFIED: Chronic | Status: ACTIVE | Noted: 2019-04-16

## 2020-01-02 ENCOUNTER — OUTPATIENT (OUTPATIENT)
Dept: OUTPATIENT SERVICES | Facility: HOSPITAL | Age: 80
LOS: 1 days | Discharge: HOME | End: 2020-01-02
Payer: MEDICARE

## 2020-01-02 ENCOUNTER — RESULT REVIEW (OUTPATIENT)
Age: 80
End: 2020-01-02

## 2020-01-02 VITALS
WEIGHT: 164.91 LBS | RESPIRATION RATE: 15 BRPM | TEMPERATURE: 99 F | OXYGEN SATURATION: 99 % | HEIGHT: 66 IN | HEART RATE: 67 BPM

## 2020-01-02 DIAGNOSIS — Z90.79 ACQUIRED ABSENCE OF OTHER GENITAL ORGAN(S): Chronic | ICD-10-CM

## 2020-01-02 DIAGNOSIS — Z98.890 OTHER SPECIFIED POSTPROCEDURAL STATES: Chronic | ICD-10-CM

## 2020-01-02 DIAGNOSIS — K21.0 GASTRO-ESOPHAGEAL REFLUX DISEASE WITH ESOPHAGITIS: ICD-10-CM

## 2020-01-02 DIAGNOSIS — I77.4 CELIAC ARTERY COMPRESSION SYNDROME: Chronic | ICD-10-CM

## 2020-01-02 PROCEDURE — 88305 TISSUE EXAM BY PATHOLOGIST: CPT | Mod: 26

## 2020-01-02 PROCEDURE — 88312 SPECIAL STAINS GROUP 1: CPT

## 2020-01-02 PROCEDURE — 88313 SPECIAL STAINS GROUP 2: CPT | Mod: 26

## 2020-01-02 PROCEDURE — 88313 SPECIAL STAINS GROUP 2: CPT

## 2020-01-02 PROCEDURE — 88305 TISSUE EXAM BY PATHOLOGIST: CPT

## 2020-01-02 PROCEDURE — 88312 SPECIAL STAINS GROUP 1: CPT | Mod: 26

## 2020-01-02 RX ORDER — ASPIRIN/CALCIUM CARB/MAGNESIUM 324 MG
1 TABLET ORAL
Qty: 0 | Refills: 0 | DISCHARGE

## 2020-01-02 RX ORDER — RANITIDINE HYDROCHLORIDE 150 MG/1
1 TABLET, FILM COATED ORAL
Qty: 0 | Refills: 0 | DISCHARGE

## 2020-01-02 NOTE — ASU PREOP CHECKLIST - SURGICAL CONSENT
Bed: B10  Expected date: 3/4/18  Expected time: 6:27 PM  Means of arrival:   Comments:  FIRST RESPONSE   58 year old female with right sided chest pain, right sided arm tingling.     205/128  97 98%   HX TIA   
Patient  Trav was updated he can be reached at [phone]. He will be on his way to the ER.   
done

## 2020-01-02 NOTE — ASU PATIENT PROFILE, ADULT - PMH
Atypical chest pain    Basal cell carcinoma (BCC), unspecified site    Diverticulitis    Essential hypertension    SBO (small bowel obstruction)

## 2020-01-05 DIAGNOSIS — F41.9 ANXIETY DISORDER, UNSPECIFIED: ICD-10-CM

## 2020-01-05 DIAGNOSIS — I10 ESSENTIAL (PRIMARY) HYPERTENSION: ICD-10-CM

## 2020-01-05 DIAGNOSIS — Z80.0 FAMILY HISTORY OF MALIGNANT NEOPLASM OF DIGESTIVE ORGANS: ICD-10-CM

## 2020-01-05 DIAGNOSIS — K44.9 DIAPHRAGMATIC HERNIA WITHOUT OBSTRUCTION OR GANGRENE: ICD-10-CM

## 2020-01-05 DIAGNOSIS — K29.00 ACUTE GASTRITIS WITHOUT BLEEDING: ICD-10-CM

## 2020-01-05 DIAGNOSIS — Z88.2 ALLERGY STATUS TO SULFONAMIDES: ICD-10-CM

## 2020-01-05 DIAGNOSIS — Z85.46 PERSONAL HISTORY OF MALIGNANT NEOPLASM OF PROSTATE: ICD-10-CM

## 2020-01-05 DIAGNOSIS — E78.5 HYPERLIPIDEMIA, UNSPECIFIED: ICD-10-CM

## 2020-01-05 DIAGNOSIS — R12 HEARTBURN: ICD-10-CM

## 2020-01-05 DIAGNOSIS — Z90.49 ACQUIRED ABSENCE OF OTHER SPECIFIED PARTS OF DIGESTIVE TRACT: ICD-10-CM

## 2020-01-05 DIAGNOSIS — K21.9 GASTRO-ESOPHAGEAL REFLUX DISEASE WITHOUT ESOPHAGITIS: ICD-10-CM

## 2020-01-05 DIAGNOSIS — F32.9 MAJOR DEPRESSIVE DISORDER, SINGLE EPISODE, UNSPECIFIED: ICD-10-CM

## 2020-01-05 DIAGNOSIS — Z87.891 PERSONAL HISTORY OF NICOTINE DEPENDENCE: ICD-10-CM

## 2020-01-05 DIAGNOSIS — Z85.858 PERSONAL HISTORY OF MALIGNANT NEOPLASM OF OTHER ENDOCRINE GLANDS: ICD-10-CM

## 2020-01-20 NOTE — ED ADULT NURSE NOTE - NSFALLRSKINDICATORS_ED_ALL_ED
"Chief Complaint   Patient presents with     Well Child       Initial BP 98/40 (BP Location: Right arm, Patient Position: Chair, Cuff Size: Adult Regular)   Pulse 72   Temp 98  F (36.7  C) (Tympanic)   Resp 15   Ht 1.505 m (4' 11.25\")   Wt 37.3 kg (82 lb 3.2 oz)   BMI 16.46 kg/m   Estimated body mass index is 16.46 kg/m  as calculated from the following:    Height as of this encounter: 1.505 m (4' 11.25\").    Weight as of this encounter: 37.3 kg (82 lb 3.2 oz).    Patient presents to the clinic using No DME    Health Maintenance that is potentially due pending provider review:  NONE    n/a    Is there anyone who you would like to be able to receive your results? Not Applicable  If yes have patient fill out RUFINO    "
no

## 2020-06-26 ENCOUNTER — EMERGENCY (EMERGENCY)
Facility: HOSPITAL | Age: 80
LOS: 0 days | Discharge: HOME | End: 2020-06-26
Payer: MEDICARE

## 2020-06-26 VITALS
OXYGEN SATURATION: 94 % | SYSTOLIC BLOOD PRESSURE: 118 MMHG | TEMPERATURE: 99 F | HEART RATE: 83 BPM | RESPIRATION RATE: 18 BRPM | DIASTOLIC BLOOD PRESSURE: 96 MMHG

## 2020-06-26 DIAGNOSIS — Z98.890 OTHER SPECIFIED POSTPROCEDURAL STATES: Chronic | ICD-10-CM

## 2020-06-26 DIAGNOSIS — Z88.2 ALLERGY STATUS TO SULFONAMIDES: ICD-10-CM

## 2020-06-26 DIAGNOSIS — I10 ESSENTIAL (PRIMARY) HYPERTENSION: ICD-10-CM

## 2020-06-26 DIAGNOSIS — Z90.79 ACQUIRED ABSENCE OF OTHER GENITAL ORGAN(S): Chronic | ICD-10-CM

## 2020-06-26 DIAGNOSIS — R19.7 DIARRHEA, UNSPECIFIED: ICD-10-CM

## 2020-06-26 DIAGNOSIS — B34.9 VIRAL INFECTION, UNSPECIFIED: ICD-10-CM

## 2020-06-26 DIAGNOSIS — R50.9 FEVER, UNSPECIFIED: ICD-10-CM

## 2020-06-26 DIAGNOSIS — I77.4 CELIAC ARTERY COMPRESSION SYNDROME: Chronic | ICD-10-CM

## 2020-06-26 DIAGNOSIS — E78.5 HYPERLIPIDEMIA, UNSPECIFIED: ICD-10-CM

## 2020-06-26 PROCEDURE — 99282 EMERGENCY DEPT VISIT SF MDM: CPT

## 2020-06-26 PROCEDURE — U0003: CPT

## 2020-06-26 PROCEDURE — 99283 EMERGENCY DEPT VISIT LOW MDM: CPT

## 2020-06-26 NOTE — ED ADULT NURSE NOTE - OBJECTIVE STATEMENT
PATIENT WAS TREATED, EVALUATED AND DISCHARGED BY INTAKE PROVIDER. PLEASE SEE PROVIDER NOTE FOR ASSESSMENT.  DISCHARGE INSTRUCTIONS REVIEWED WITH PATIENT VERBALLY, PT VERBALIZED UNDERSTANDING OF DISCHARGE INSTRUCTIONS. PAPER COPY OF DISCHARGE INSTRUCTIONS GIVEN TO PATIENT WITH SELF SARAH AND COVID 19 INFORMATION.

## 2020-06-26 NOTE — ED STATDOCS - OBJECTIVE STATEMENT
He has a PMHx of HTN, HLD, stent in celiac artery, carotid artery dx.  He presents to ED sent in by PMD, Dr. Mathew, for COVID19 testing as he had a few episodes of diarrhea last night with a low grade fever as well as 1 episode of diarrhea this morning.  He feels much better today, denies any complaints.

## 2020-06-26 NOTE — ED STATDOCS - NSFOLLOWUPINSTRUCTIONS_ED_ALL_ED_FT
How to get your Coronavirus (COVID-19) Testing Results:   Please be advised that you were tested for the coronavirus (COVID-19) in the Emergency Department at [facility].  You are to maintain self-quarantine procedures for 14 days until instructed otherwise by one of our healthcare agents. Please note that the test may take up to 2-4 days to result.  If you do not hear from us within 72 hours and you'd like to check on your results, you can call on of our coronavirus specialists at [address] [place] [address] (available 24/7).  Please DO NOT call the site where you received the test to obtain your results.

## 2020-06-26 NOTE — ED STATDOCS - PATIENT PORTAL LINK FT
You can access the FollowMyHealth Patient Portal offered by [facility] by registering at the following website: [email or web address]. By joining [employer]’s FollowMyHealth portal, you will also be able to view your health information using other applications (apps) compatible with our system.

## 2020-06-27 LAB — SARS-COV-2 RNA SPEC QL NAA+PROBE: SIGNIFICANT CHANGE UP

## 2021-02-01 ENCOUNTER — INPATIENT (INPATIENT)
Facility: HOSPITAL | Age: 81
LOS: 1 days | Discharge: HOME | DRG: 392 | End: 2021-02-03
Attending: STUDENT IN AN ORGANIZED HEALTH CARE EDUCATION/TRAINING PROGRAM | Admitting: FAMILY MEDICINE
Payer: MEDICARE

## 2021-02-01 VITALS
OXYGEN SATURATION: 94 % | RESPIRATION RATE: 17 BRPM | HEIGHT: 66 IN | SYSTOLIC BLOOD PRESSURE: 165 MMHG | DIASTOLIC BLOOD PRESSURE: 84 MMHG | WEIGHT: 166.89 LBS | TEMPERATURE: 99 F | HEART RATE: 78 BPM

## 2021-02-01 DIAGNOSIS — Z98.890 OTHER SPECIFIED POSTPROCEDURAL STATES: Chronic | ICD-10-CM

## 2021-02-01 DIAGNOSIS — I77.4 CELIAC ARTERY COMPRESSION SYNDROME: Chronic | ICD-10-CM

## 2021-02-01 DIAGNOSIS — R07.9 CHEST PAIN, UNSPECIFIED: ICD-10-CM

## 2021-02-01 DIAGNOSIS — Z90.79 ACQUIRED ABSENCE OF OTHER GENITAL ORGAN(S): Chronic | ICD-10-CM

## 2021-02-01 LAB
ADD ON TEST-SPECIMEN IN LAB: SIGNIFICANT CHANGE UP
ALBUMIN SERPL ELPH-MCNC: 3.5 G/DL — SIGNIFICANT CHANGE UP (ref 3.3–5)
ALP SERPL-CCNC: 70 U/L — SIGNIFICANT CHANGE UP (ref 40–120)
ALT FLD-CCNC: 50 U/L — SIGNIFICANT CHANGE UP (ref 12–78)
ANION GAP SERPL CALC-SCNC: 6 MMOL/L — SIGNIFICANT CHANGE UP (ref 5–17)
AST SERPL-CCNC: 25 U/L — SIGNIFICANT CHANGE UP (ref 15–37)
BASOPHILS # BLD AUTO: 0.03 K/UL — SIGNIFICANT CHANGE UP (ref 0–0.2)
BASOPHILS NFR BLD AUTO: 0.4 % — SIGNIFICANT CHANGE UP (ref 0–2)
BILIRUB SERPL-MCNC: 0.8 MG/DL — SIGNIFICANT CHANGE UP (ref 0.2–1.2)
BUN SERPL-MCNC: 22 MG/DL — SIGNIFICANT CHANGE UP (ref 7–23)
CALCIUM SERPL-MCNC: 8.9 MG/DL — SIGNIFICANT CHANGE UP (ref 8.5–10.1)
CHLORIDE SERPL-SCNC: 98 MMOL/L — SIGNIFICANT CHANGE UP (ref 96–108)
CO2 SERPL-SCNC: 29 MMOL/L — SIGNIFICANT CHANGE UP (ref 22–31)
CREAT SERPL-MCNC: 1.2 MG/DL — SIGNIFICANT CHANGE UP (ref 0.5–1.3)
EOSINOPHIL # BLD AUTO: 0.02 K/UL — SIGNIFICANT CHANGE UP (ref 0–0.5)
EOSINOPHIL NFR BLD AUTO: 0.3 % — SIGNIFICANT CHANGE UP (ref 0–6)
GLUCOSE SERPL-MCNC: 141 MG/DL — HIGH (ref 70–99)
HCT VFR BLD CALC: 44.9 % — SIGNIFICANT CHANGE UP (ref 39–50)
HGB BLD-MCNC: 15.8 G/DL — SIGNIFICANT CHANGE UP (ref 13–17)
IMM GRANULOCYTES NFR BLD AUTO: 0.1 % — SIGNIFICANT CHANGE UP (ref 0–1.5)
LIDOCAIN IGE QN: 96 U/L — SIGNIFICANT CHANGE UP (ref 73–393)
LYMPHOCYTES # BLD AUTO: 1.52 K/UL — SIGNIFICANT CHANGE UP (ref 1–3.3)
LYMPHOCYTES # BLD AUTO: 19.7 % — SIGNIFICANT CHANGE UP (ref 13–44)
MCHC RBC-ENTMCNC: 32.4 PG — SIGNIFICANT CHANGE UP (ref 27–34)
MCHC RBC-ENTMCNC: 35.2 GM/DL — SIGNIFICANT CHANGE UP (ref 32–36)
MCV RBC AUTO: 92 FL — SIGNIFICANT CHANGE UP (ref 80–100)
MONOCYTES # BLD AUTO: 0.48 K/UL — SIGNIFICANT CHANGE UP (ref 0–0.9)
MONOCYTES NFR BLD AUTO: 6.2 % — SIGNIFICANT CHANGE UP (ref 2–14)
NEUTROPHILS # BLD AUTO: 5.64 K/UL — SIGNIFICANT CHANGE UP (ref 1.8–7.4)
NEUTROPHILS NFR BLD AUTO: 73.3 % — SIGNIFICANT CHANGE UP (ref 43–77)
NT-PROBNP SERPL-SCNC: 35 PG/ML — SIGNIFICANT CHANGE UP (ref 0–450)
PLATELET # BLD AUTO: 191 K/UL — SIGNIFICANT CHANGE UP (ref 150–400)
POTASSIUM SERPL-MCNC: 3.4 MMOL/L — LOW (ref 3.5–5.3)
POTASSIUM SERPL-SCNC: 3.4 MMOL/L — LOW (ref 3.5–5.3)
PROT SERPL-MCNC: 6.9 GM/DL — SIGNIFICANT CHANGE UP (ref 6–8.3)
RBC # BLD: 4.88 M/UL — SIGNIFICANT CHANGE UP (ref 4.2–5.8)
RBC # FLD: 12.5 % — SIGNIFICANT CHANGE UP (ref 10.3–14.5)
SARS-COV-2 RNA SPEC QL NAA+PROBE: SIGNIFICANT CHANGE UP
SODIUM SERPL-SCNC: 133 MMOL/L — LOW (ref 135–145)
TROPONIN I SERPL-MCNC: <0.015 NG/ML — SIGNIFICANT CHANGE UP (ref 0.01–0.04)
WBC # BLD: 7.7 K/UL — SIGNIFICANT CHANGE UP (ref 3.8–10.5)
WBC # FLD AUTO: 7.7 K/UL — SIGNIFICANT CHANGE UP (ref 3.8–10.5)

## 2021-02-01 PROCEDURE — U0003: CPT

## 2021-02-01 PROCEDURE — U0005: CPT

## 2021-02-01 PROCEDURE — 78452 HT MUSCLE IMAGE SPECT MULT: CPT

## 2021-02-01 PROCEDURE — 93017 CV STRESS TEST TRACING ONLY: CPT

## 2021-02-01 PROCEDURE — 93010 ELECTROCARDIOGRAM REPORT: CPT

## 2021-02-01 PROCEDURE — 80048 BASIC METABOLIC PNL TOTAL CA: CPT

## 2021-02-01 PROCEDURE — A9500: CPT

## 2021-02-01 PROCEDURE — 99223 1ST HOSP IP/OBS HIGH 75: CPT | Mod: AI

## 2021-02-01 PROCEDURE — 36415 COLL VENOUS BLD VENIPUNCTURE: CPT

## 2021-02-01 PROCEDURE — 86769 SARS-COV-2 COVID-19 ANTIBODY: CPT

## 2021-02-01 PROCEDURE — 84484 ASSAY OF TROPONIN QUANT: CPT

## 2021-02-01 PROCEDURE — 71045 X-RAY EXAM CHEST 1 VIEW: CPT | Mod: 26

## 2021-02-01 RX ORDER — ATORVASTATIN CALCIUM 80 MG/1
10 TABLET, FILM COATED ORAL AT BEDTIME
Refills: 0 | Status: DISCONTINUED | OUTPATIENT
Start: 2021-02-01 | End: 2021-02-03

## 2021-02-01 RX ORDER — FAMOTIDINE 10 MG/ML
20 INJECTION INTRAVENOUS
Refills: 0 | Status: DISCONTINUED | OUTPATIENT
Start: 2021-02-01 | End: 2021-02-03

## 2021-02-01 RX ORDER — ASPIRIN/CALCIUM CARB/MAGNESIUM 324 MG
325 TABLET ORAL ONCE
Refills: 0 | Status: COMPLETED | OUTPATIENT
Start: 2021-02-01 | End: 2021-02-01

## 2021-02-01 RX ORDER — VALSARTAN 80 MG/1
1 TABLET ORAL
Qty: 0 | Refills: 0 | DISCHARGE

## 2021-02-01 RX ORDER — CLOPIDOGREL BISULFATE 75 MG/1
75 TABLET, FILM COATED ORAL DAILY
Refills: 0 | Status: DISCONTINUED | OUTPATIENT
Start: 2021-02-01 | End: 2021-02-02

## 2021-02-01 RX ORDER — AMLODIPINE BESYLATE 2.5 MG/1
10 TABLET ORAL DAILY
Refills: 0 | Status: DISCONTINUED | OUTPATIENT
Start: 2021-02-01 | End: 2021-02-02

## 2021-02-01 RX ORDER — LOSARTAN POTASSIUM 100 MG/1
100 TABLET, FILM COATED ORAL DAILY
Refills: 0 | Status: DISCONTINUED | OUTPATIENT
Start: 2021-02-01 | End: 2021-02-02

## 2021-02-01 RX ORDER — LANOLIN ALCOHOL/MO/W.PET/CERES
5 CREAM (GRAM) TOPICAL AT BEDTIME
Refills: 0 | Status: DISCONTINUED | OUTPATIENT
Start: 2021-02-01 | End: 2021-02-03

## 2021-02-01 RX ORDER — HYDROCHLOROTHIAZIDE 25 MG
25 TABLET ORAL DAILY
Refills: 0 | Status: DISCONTINUED | OUTPATIENT
Start: 2021-02-01 | End: 2021-02-02

## 2021-02-01 RX ORDER — HEPARIN SODIUM 5000 [USP'U]/ML
5000 INJECTION INTRAVENOUS; SUBCUTANEOUS EVERY 12 HOURS
Refills: 0 | Status: DISCONTINUED | OUTPATIENT
Start: 2021-02-01 | End: 2021-02-03

## 2021-02-01 RX ORDER — AMITRIPTYLINE HCL 25 MG
1 TABLET ORAL
Qty: 0 | Refills: 0 | DISCHARGE

## 2021-02-01 RX ORDER — AMITRIPTYLINE HCL 25 MG
30 TABLET ORAL AT BEDTIME
Refills: 0 | Status: DISCONTINUED | OUTPATIENT
Start: 2021-02-01 | End: 2021-02-03

## 2021-02-01 RX ORDER — CHOLECALCIFEROL (VITAMIN D3) 125 MCG
1000 CAPSULE ORAL DAILY
Refills: 0 | Status: DISCONTINUED | OUTPATIENT
Start: 2021-02-01 | End: 2021-02-03

## 2021-02-01 RX ORDER — POTASSIUM CHLORIDE 20 MEQ
40 PACKET (EA) ORAL ONCE
Refills: 0 | Status: COMPLETED | OUTPATIENT
Start: 2021-02-01 | End: 2021-02-01

## 2021-02-01 RX ORDER — METOPROLOL TARTRATE 50 MG
25 TABLET ORAL
Refills: 0 | Status: DISCONTINUED | OUTPATIENT
Start: 2021-02-01 | End: 2021-02-02

## 2021-02-01 RX ORDER — PANTOPRAZOLE SODIUM 20 MG/1
40 TABLET, DELAYED RELEASE ORAL DAILY
Refills: 0 | Status: DISCONTINUED | OUTPATIENT
Start: 2021-02-01 | End: 2021-02-03

## 2021-02-01 RX ORDER — FAMOTIDINE 10 MG/ML
20 INJECTION INTRAVENOUS ONCE
Refills: 0 | Status: COMPLETED | OUTPATIENT
Start: 2021-02-01 | End: 2021-02-01

## 2021-02-01 RX ORDER — METOPROLOL TARTRATE 50 MG
1 TABLET ORAL
Qty: 0 | Refills: 0 | DISCHARGE

## 2021-02-01 RX ADMIN — Medication 5 MILLIGRAM(S): at 22:55

## 2021-02-01 RX ADMIN — Medication 40 MILLIEQUIVALENT(S): at 21:59

## 2021-02-01 RX ADMIN — Medication 325 MILLIGRAM(S): at 21:58

## 2021-02-01 RX ADMIN — FAMOTIDINE 20 MILLIGRAM(S): 10 INJECTION INTRAVENOUS at 15:02

## 2021-02-01 RX ADMIN — Medication 30 MILLIGRAM(S): at 22:55

## 2021-02-01 RX ADMIN — ATORVASTATIN CALCIUM 10 MILLIGRAM(S): 80 TABLET, FILM COATED ORAL at 21:59

## 2021-02-01 RX ADMIN — Medication 25 MILLIGRAM(S): at 21:59

## 2021-02-01 RX ADMIN — Medication 30 MILLILITER(S): at 15:02

## 2021-02-01 NOTE — H&P ADULT - NSICDXPASTMEDICALHX_GEN_ALL_CORE_FT
PAST MEDICAL HISTORY:  Basal cell carcinoma (BCC), unspecified site     Diverticulitis     Essential hypertension     History of carotid stenosis     Hyperlipidemia      PAST MEDICAL HISTORY:  Anxiety     Basal cell carcinoma (BCC), unspecified site     Diverticulitis     Essential hypertension     History of carotid stenosis     Hyperlipidemia     Prostate cancer

## 2021-02-01 NOTE — H&P ADULT - NSICDXFAMILYHX_GEN_ALL_CORE_FT
FAMILY HISTORY:  No pertinent family history in first degree relatives     FAMILY HISTORY:  Family history of CHF (congestive heart failure), father  Family history of colon cancer in father

## 2021-02-01 NOTE — H&P ADULT - NSHPREVIEWOFSYSTEMS_GEN_ALL_CORE
Gen: negative for fevers, chills, weight loss, or weight gain  Eyes: no blurred vision or lacrimation  ENT: no tinnitus, vertigo, or decreased hearing  Resp: + chronic cough. No wheezing, dyspnea, pleuritic chest pain, hemoptysis, or orthopnea  CV: + chest pain. No dyspnea on exertion or palpitations  GI: no nausea, vomiting, abdominal pain, diarrhea, constipation, melena, or hematochezia  : no dysuria, hematuria, or incontinence  MSK: no arthralgias, joint swelling, or myalgias  Neuro: no focal deficits, confusion, weakness, dizziness, tremors, or seizures  Skin: no rash, lesions, or edema Gen: negative for fevers, chills, weight loss, or weight gain  Eyes: no blurred vision or lacrimation  ENT: no tinnitus, vertigo, or decreased hearing  Resp: + chronic cough. No wheezing, dyspnea, pleuritic chest pain, hemoptysis, or orthopnea  CV: + chest pain. No dyspnea on exertion or palpitations  GI: no nausea, vomiting, abdominal pain, diarrhea, constipation, melena, or hematochezia  : no dysuria, hematuria, or incontinence  MSK: no arthralgias, joint swelling, or myalgias  Neuro: + dizziness. No focal deficits, confusion, weakness, tremors, or seizures  Skin: no rash, lesions, or edema Warm

## 2021-02-01 NOTE — ED PROVIDER NOTE - PHYSICAL EXAMINATION
Constitutional: NAD, well appearing  HEENT: no rhinorrhea  CVS:  RRR, no m/r/g  Resp:  CTAB  GI: mild epigastric ttp, no rebound or guarding  MSK:  no restriction to rom, full ROM to all extremities  Neuro:  A&Ox3, 5/5 strength to all extremities,  SILT to all extremities  Skin: no rash  psych: clear thought content  Heme/lymph:  No LAD

## 2021-02-01 NOTE — H&P ADULT - NSHPSOCIALHISTORY_GEN_ALL_CORE
Former smoker, 2 packs per day, stopped in 1970s.  Drinks usually 2 glasses of wine per night, and about once per week will add son gin to this.  No drug use.  Lives at home with his wife.

## 2021-02-01 NOTE — H&P ADULT - NSHPPHYSICALEXAM_GEN_ALL_CORE
Vital Signs Last 24 Hrs  T(C): 37.4 (01 Feb 2021 14:22), Max: 37.4 (01 Feb 2021 14:22)  T(F): 99.4 (01 Feb 2021 14:22), Max: 99.4 (01 Feb 2021 14:22)  HR: 78 (01 Feb 2021 14:22) (78 - 78)  BP: 165/84 (01 Feb 2021 14:22) (165/84 - 165/84)  BP(mean): --  RR: 17 (01 Feb 2021 14:22) (17 - 17)  SpO2: 94% (01 Feb 2021 14:22) (94% - 94%) Vital Signs Last 24 Hrs  T(C): 37.4 (01 Feb 2021 14:22), Max: 37.4 (01 Feb 2021 14:22)  T(F): 99.4 (01 Feb 2021 14:22), Max: 99.4 (01 Feb 2021 14:22)  HR: 78 (01 Feb 2021 14:22) (78 - 78)  BP: 165/84 (01 Feb 2021 14:22) (165/84 - 165/84)  BP(mean): --  RR: 17 (01 Feb 2021 14:22) (17 - 17)  SpO2: 94% (01 Feb 2021 14:22) (94% - 94%)    GENERAL: No acute distress  HEENT: PERRL, EOMI, MMM, no oropharyngeal lesions  NECK: supple, no stiffness, no JVD, no thyromegaly  PULM: respirations non-labored, clear to auscultation bilaterally, no rales, rhonchi, or wheezes  CV: regular rate and rhythm, no murmurs, gallops, or rubs  GI: abdomen soft, nontender, nondistended, no masses felt, normal bowel sounds  MSK: strength 5/5 bilateral upper/lower extremities. No joint swelling, erythema, or warmth.  LYMPH: no anterior cervical, posterior cervical, supraclavicular, or inguinal lymphadenopathy  NEURO: A&Ox3, no tremors, sensation intact  SKIN: no rashes, lesions, or edema

## 2021-02-01 NOTE — ED ADULT NURSE NOTE - OBJECTIVE STATEMENT
patient axox3, c/o intermittent epigastric pain and chest discomfort that began this AM. patient states pain started when he was sitting at his desk this morning doing bills. patient states nothing makes the pain better or worse. no meds taken PTA. patient denies headache, n/v/d, fever, chills, cough, SOB.

## 2021-02-01 NOTE — H&P ADULT - ASSESSMENT
79 yo M with a PMH carotid stenosis, HTN, and HLD who presents with chest pain. 81 yo M with a PMH carotid stenosis, prostate cancer, anxiety, HTN, and HLD who presents with chest pain.    1) Atypical chest pain  - Given patient's new EKG changes including TWIs and Q waves, most concerning for unstable angina  - Troponin negative x2, will trend at least x 1 more  - Monitor on telemetry  - Will give aspirin 325 mg x1  - C/w Clopidogrel and statin  - Cardiology consult  - Stress test in AM  - NPO after midnight  - Given how eating affects symptoms, may also be due to GERD  - C/w PPI 40 mg QD  - Maalox and Pepcid PRN    2) GERD  - C/w PPI, as well as PRNs above    3) HTN  - C/w Amlodipine 10 mg QD  - C/w Metoprolol succinate 25 mg BID  - C/w Losartan equivalent dose of Irbesartan 300 mg QD  - C/w HCTZ 25 mg QD (check orthostatics as well)    4) HLD  - C/w Atorvastatin equivalent dose of Livalo 2 mg QHS  - Zetia not on formulary    5) Anxiety  - C/w Amitriptyline 30 mg QHS  - C/w Melatonin 3mg QHS    6) Hypokalemia  - 3.4, mild  - Replete PO  - Check Mg  - Recheck in AM    7) Prophylactic measure  - DVT PPX: IMPROVE score of 1, but will give SQH Q12H  - Diet: DASH, NPO after midnight  - Dispo: pending improvement in sxs, cardiology eval

## 2021-02-01 NOTE — ED PROVIDER NOTE - CLINICAL SUMMARY MEDICAL DECISION MAKING FREE TEXT BOX
Pt with moderate HEART score - 4-5.  Story not c/w PE - satting well with normal HR and no preceding risk factors aside from age - below threshold for further w/u.  No e/o ptx/pna/carditis.  Story not c/w dissection - below threshold for further w/u.  Mild epigastric pain as well, had small amount of gin last night - will eval for pancreatitis and treat for gerd/gastritis as well.  Will discuss admission vs close outpatient f/u with patient.  S/o to Dr. Wynne pending labs/trops and reassessment.

## 2021-02-01 NOTE — H&P ADULT - HISTORY OF PRESENT ILLNESS
79 yo M with a PMH carotid stenosis, HTN, and HLD who presents with chest pain.    In the ED, he received Famotidine 20 mg IV x1 and Maalox 30 ml PO x1. 79 yo M with a PMH carotid stenosis, prostate cancer, anxiety, HTN, and HLD who presents with chest pain. This morning, he developed subxiphoid upper abdominal/lower chest pain radiating up his chest into his left shoulder, achy. He denies any associated SOB, wheezing, or palpitations. He also denies nausea, vomiting, diarrhea, blood in his stool, urinary symptoms, rash, swelling, fevers, or chills. His pain improved after a short while, and after he ate, the pain came back again and lasted longer, so he decided to go to the ED.  He notes a chronic nonproductive cough, that has not worsened recently.    Of note, he was recently diagnosed with prostate cancer, Jace score of 6, and limited, non-metastatic.    Prior cardiologist was Dr. Hicks, who retired.    In the ED, he received Famotidine 20 mg IV x1 and Maalox 30 ml PO x1. 79 yo M with a PMH carotid stenosis, prostate cancer, anxiety, HTN, and HLD who presents with chest pain. This morning, he developed subxiphoid upper abdominal/lower chest pain radiating up his chest into his left shoulder, achy. He denies any associated SOB, wheezing, or palpitations. He also denies nausea, vomiting, diarrhea, blood in his stool, urinary symptoms, rash, swelling, fevers, or chills. His pain improved after a short while, and after he ate, the pain came back again and lasted longer, so he decided to go to the ED.  He notes a chronic nonproductive cough, that has not worsened recently.  He also notes occasional lightheadedness when he stands up, ever since his HCTZ dose was doubled.    Of note, he was recently diagnosed with prostate cancer, Jace score of 6, and limited, non-metastatic.    Prior cardiologist was Dr. Hicks, who retired.    In the ED, he received Famotidine 20 mg IV x1 and Maalox 30 ml PO x1.

## 2021-02-01 NOTE — H&P ADULT - NSHPLABSRESULTS_GEN_ALL_CORE
Labs personally reviewed and interpreted. Notable for no leukocytosis (WBC 7.7), left shift, or lymphopenia. Hb 15.8, plt 191, Na slightly low at 133, K slightly low at 3.4, Cl 98, HCO3 29, BUN/creatinine 22/1.2 (creatinine at baseline), BG slightly elevated at 141, calcium 8.9 with albumin 3.5, LFTs wnl, lipase normal at 96, troponin negative x2, and proBNP normal at 35.    CXR personally reviewed and interpreted. Notable for clear lungs, no focal consolidations, effusions, interstitial markings, pneumothorax, or obvious cardiomegaly.      EKG _______________ Labs personally reviewed and interpreted. Notable for no leukocytosis (WBC 7.7), left shift, or lymphopenia. Hb 15.8, plt 191, Na slightly low at 133, K slightly low at 3.4, Cl 98, HCO3 29, BUN/creatinine 22/1.2 (creatinine at baseline), BG slightly elevated at 141, calcium 8.9 with albumin 3.5, LFTs wnl, lipase normal at 96, troponin negative x2, and proBNP normal at 35.    CXR personally reviewed and interpreted. Notable for clear lungs, no focal consolidations, effusions, interstitial markings, pneumothorax, or obvious cardiomegaly.    EKG personally reviewed. Normal sinus rhythm, normal axis. New Q wave in lead III with poor R wave precordial progression (new). New biphasic T wave in lead II and new TWI in leads aVF and III. No ST depressions or elevations. Rate 73, , QTc 427.

## 2021-02-01 NOTE — ED PROVIDER NOTE - NS ED ROS FT
Constitutional: nad, well appearing  HEENT:  no nasal congestion, eye drainage or ear pain.    CVS:  + cp  Resp:  No sob, no cough  GI:  no abdominal pain, no nausea or vomiting  :  no dysuria  MSK: no joint pain or limited ROM  Skin: no rash  Neuro: no change in mental status or level of consciousness  Heme/lymph: no bleeding

## 2021-02-01 NOTE — ED PROVIDER NOTE - OBJECTIVE STATEMENT
80 Y m pmh of carotid stenosis, HTN, HL presenting with epigastric and left sided cp ongoing for the last day but worsening this morning.  Denies f/c/cough/sob/n/v/d.  No covid contacts. No exertional component.  Last stress test about a year ago.  Was followed by Dr. Hicks in the past, but he recently retired so does not currently have a cardiologist.

## 2021-02-02 DIAGNOSIS — R07.9 CHEST PAIN, UNSPECIFIED: ICD-10-CM

## 2021-02-02 DIAGNOSIS — E78.5 HYPERLIPIDEMIA, UNSPECIFIED: ICD-10-CM

## 2021-02-02 DIAGNOSIS — I10 ESSENTIAL (PRIMARY) HYPERTENSION: ICD-10-CM

## 2021-02-02 LAB
ANION GAP SERPL CALC-SCNC: 3 MMOL/L — LOW (ref 5–17)
BUN SERPL-MCNC: 18 MG/DL — SIGNIFICANT CHANGE UP (ref 7–23)
CALCIUM SERPL-MCNC: 8.9 MG/DL — SIGNIFICANT CHANGE UP (ref 8.5–10.1)
CHLORIDE SERPL-SCNC: 102 MMOL/L — SIGNIFICANT CHANGE UP (ref 96–108)
CO2 SERPL-SCNC: 31 MMOL/L — SIGNIFICANT CHANGE UP (ref 22–31)
CREAT SERPL-MCNC: 1.09 MG/DL — SIGNIFICANT CHANGE UP (ref 0.5–1.3)
GLUCOSE SERPL-MCNC: 109 MG/DL — HIGH (ref 70–99)
POTASSIUM SERPL-MCNC: 3.6 MMOL/L — SIGNIFICANT CHANGE UP (ref 3.5–5.3)
POTASSIUM SERPL-SCNC: 3.6 MMOL/L — SIGNIFICANT CHANGE UP (ref 3.5–5.3)
SARS-COV-2 IGG SERPL QL IA: NEGATIVE — SIGNIFICANT CHANGE UP
SARS-COV-2 IGM SERPL IA-ACNC: 0.08 INDEX — SIGNIFICANT CHANGE UP
SODIUM SERPL-SCNC: 136 MMOL/L — SIGNIFICANT CHANGE UP (ref 135–145)

## 2021-02-02 PROCEDURE — 93016 CV STRESS TEST SUPVJ ONLY: CPT

## 2021-02-02 PROCEDURE — 93018 CV STRESS TEST I&R ONLY: CPT

## 2021-02-02 PROCEDURE — 99232 SBSQ HOSP IP/OBS MODERATE 35: CPT

## 2021-02-02 PROCEDURE — 78452 HT MUSCLE IMAGE SPECT MULT: CPT | Mod: 26

## 2021-02-02 PROCEDURE — 99223 1ST HOSP IP/OBS HIGH 75: CPT

## 2021-02-02 RX ORDER — METOPROLOL TARTRATE 50 MG
25 TABLET ORAL DAILY
Refills: 0 | Status: DISCONTINUED | OUTPATIENT
Start: 2021-02-02 | End: 2021-02-03

## 2021-02-02 RX ORDER — REGADENOSON 0.08 MG/ML
0.4 INJECTION, SOLUTION INTRAVENOUS ONCE
Refills: 0 | Status: COMPLETED | OUTPATIENT
Start: 2021-02-02 | End: 2021-02-02

## 2021-02-02 RX ORDER — LOSARTAN POTASSIUM 100 MG/1
100 TABLET, FILM COATED ORAL DAILY
Refills: 0 | Status: DISCONTINUED | OUTPATIENT
Start: 2021-02-02 | End: 2021-02-03

## 2021-02-02 RX ORDER — ASPIRIN/CALCIUM CARB/MAGNESIUM 324 MG
81 TABLET ORAL DAILY
Refills: 0 | Status: DISCONTINUED | OUTPATIENT
Start: 2021-02-02 | End: 2021-02-03

## 2021-02-02 RX ADMIN — Medication 1 TABLET(S): at 14:30

## 2021-02-02 RX ADMIN — Medication 1000 UNIT(S): at 14:30

## 2021-02-02 RX ADMIN — REGADENOSON 0.4 MILLIGRAM(S): 0.08 INJECTION, SOLUTION INTRAVENOUS at 10:20

## 2021-02-02 RX ADMIN — ATORVASTATIN CALCIUM 10 MILLIGRAM(S): 80 TABLET, FILM COATED ORAL at 22:25

## 2021-02-02 RX ADMIN — HEPARIN SODIUM 5000 UNIT(S): 5000 INJECTION INTRAVENOUS; SUBCUTANEOUS at 22:25

## 2021-02-02 RX ADMIN — Medication 30 MILLIGRAM(S): at 22:25

## 2021-02-02 RX ADMIN — Medication 5 MILLIGRAM(S): at 22:25

## 2021-02-02 NOTE — CONSULT NOTE ADULT - PROBLEM SELECTOR RECOMMENDATION 9
Chest pain description atypical for angina and serial assays of troponin have been normal; symptoms have improved.  Given the presence of multiple risk factors for heart disease (HTN, HLD, former tobacco, advancing age, and established vascular disease) I recommend an ischemia evaluation -- he says that he has had difficulty achieving goal HR on past stress tests and currently takes metoprolol -- referred for pharmacologic nuclear stress test.

## 2021-02-02 NOTE — ED ADULT NURSE REASSESSMENT NOTE - NS ED NURSE REASSESS COMMENT FT1
Patient transported back to ED after stress test, patient denies CP at this time. patient +orthostatic hypotension. Contacted Hospitalist Ivis regarding orthostatics and current mediations ordered, instructed to hold all medications. Hospitalist to be at bedside for re assessment, will continue to monitor

## 2021-02-02 NOTE — PROGRESS NOTE ADULT - ASSESSMENT
81 yo M with a PMH carotid stenosis, prostate cancer, anxiety, HTN, and HLD who presents with chest pain.    #Atypical chest pain with   - new EKG changes including TWIs and Q waves,   - Troponin negative x3,  - telemetry  - s/paspirin 325 mg x1, asa81  - C/w Clopidogrel and statin  C/w metoprolol  cardiology consulted by admitter  - Given how eating affects symptoms, may also be due to GERD  - C/w PPI 40 mg QD  - Maalox and Pepcid PRN    # GERD  - C/w PPI,    #) HTN  - C/w Amlodipine 10 mg QD  - C/w Metoprolol succinate 25 mg BID  - C/w Losartan equivalent dose of Irbesartan 300 mg QD  - C/w HCTZ 25 mg QD (check orthostatics as well)    4) HLD  - C/w Atorvastatin equivalent dose of Livalo 2 mg QHS  - Zetia not on formulary    5) Anxiety  - C/w Amitriptyline 30 mg QHS  - C/w Melatonin 3mg QHS    6) Hypokalemia  repleted  recheck BMP     7) Prophylactic measure  - DVT PPX: IMPROVE score of 1, but will give SQH Q12H  - Diet: DASH, NPO after midnight  - Dispo: pending improvement in sxs, cardiology eval 81 yo M with a PMH carotid stenosis, prostate cancer, anxiety, HTN, and HLD who presents with chest pain.    #Atypical chest pain with   - new EKG changes including TWIs and Q waves,   Dr duran ordered stress test this am  - Troponin negative x3,  - telemetry  - s/paspirin 325 mg x1, asa81  - C/w Clopidogrel and statin  C/w metoprolol  cardiology consulted by admitter  - Given how eating affects symptoms, may also be due to GERD  - C/w PPI 40 mg QD  - Maalox and Pepcid PRN    # GERD  - C/w PPI,    #) HTN  - C/w Amlodipine 10 mg QD  - C/w Metoprolol succinate 25 mg BID  - C/w Losartan equivalent dose of Irbesartan 300 mg QD  - C/w HCTZ 25 mg QD (check orthostatics as well)    4) HLD  - C/w Atorvastatin equivalent dose of Livalo 2 mg QHS  - Zetia not on formulary    5) Anxiety  - C/w Amitriptyline 30 mg QHS  - C/w Melatonin 3mg QHS    6) Hypokalemia  repleted  recheck BMP     7) Prophylactic measure  - DVT PPX: IMPROVE score of 1, but will give SQH Q12H  - Diet: DASH, NPO after midnight  - Dispo: pending improvement in sxs, cardiology ace 79 yo M with a PMH carotid stenosis, prostate cancer, anxiety, HTN, and HLD who presents with chest pain.    #Atypical chest pain with   - new EKG changes including TWIs and Q waves,   Dr duran ordered stress test this am  - Troponin negative x3,  - telemetry  - s/paspirin 325 mg x1, asa81  - C/w Clopidogrel and statin  C/w metoprolol  cardiology consulted by admitter  - Given how eating affects symptoms, may also be due to GERD  - C/w PPI 40 mg QD  - Maalox and Pepcid PRN       ADDENDUM 3 PM 2/2/21  stress test neg-stopped plavix, continue asa81 , statin  pt now with symptomatic orthostatic hypotension- held amlodipine, ARB, HCTZ, continue BB with BP parameters  if orthostatic hypotension improves and symptoms resolve(dizziness while standing) over next 24 to 48 hrs - discharge planning home-   PT eval  i discussed with daughter carmella newberry who is ED RN at [facility] who agreed  I informed dr catherine who will f/u in AM     # GERD  - C/w PPI,    #)labile  HTN with symptomatic  orthostatic hypotension  - held Amlodipine 10 mg QD  - C/w Metoprolol succinate with BP parameters  - held Losartan equivalent dose of Irbesartan 300 mg QD  - held  HCTZ 25 mg QD     4) HLD  - C/w Atorvastatin equivalent dose of Livalo 2 mg QHS  - Zetia not on formulary    5) Anxiety  - C/w Amitriptyline 30 mg QHS  - C/w Melatonin 3mg QHS    6) Hypokalemia  repleted  recheck BMP     7) Prophylactic measure  - DVT PPX: IMPROVE score of 1, but will give SQH Q12H  - Diet: DASH, NPO after midnight  - Dispo: pending improvement in sxs, cardiology eval

## 2021-02-02 NOTE — CONSULT NOTE ADULT - SUBJECTIVE AND OBJECTIVE BOX
CHIEF COMPLAINT: Patient is a 80y old  Male who presents with a chief complaint of chest pain (01 Feb 2021 20:04)    HPI:  80 year old man with a history of carotid artery disease s/p CEA, celiac artery stenosis s/p stent, prostate cancer, anxiety, HTN, and HLD who presented to the ER on 2/1 for the evaluation of chest discomfort.  He describes "a couple" of days of mild discomfort in midline just below the sternum with radiation towards the left lateral aspect of his chest and a separate mild discomfort at the posterior aspect of his left shoulder.  he is unable to identify any clear exacerbating or alleviating factors.  he denies any associated dyspnea or diaphoresis.  He has no past history of heart disease.  He presently complains of "muscle aches" but chest symptoms have resolved.    PAST MEDICAL & SURGICAL HISTORY:  Anxiety  Prostate cancer  History of carotid stenosis  Hyperlipidemia  Basal cell carcinoma (BCC), unspecified site  Diverticulitis  Essential hypertension  H/O transurethral resection of prostate 2000  H/O bilateral inguinal hernia repair 1996  H/O hemorrhoidectomy 2006  History of carotid endarterectomy  Celiac artery stenosis    SOCIAL HISTORY:   Alcohol: Daily etOH use  Smoking: Former smoker    FAMILY HISTORY:   Family history of colon cancer in father  Family history of CHF (congestive heart failure), father (not premature)    MEDICATIONS  (STANDING):  amitriptyline 30 milliGRAM(s) Oral at bedtime  amLODIPine   Tablet 10 milliGRAM(s) Oral daily  atorvastatin 10 milliGRAM(s) Oral at bedtime  cholecalciferol 1000 Unit(s) Oral daily  clopidogrel Tablet 75 milliGRAM(s) Oral daily  heparin   Injectable 5000 Unit(s) SubCutaneous every 12 hours  hydrochlorothiazide 25 milliGRAM(s) Oral daily  losartan 100 milliGRAM(s) Oral daily  melatonin 5 milliGRAM(s) Oral at bedtime  metoprolol succinate ER 25 milliGRAM(s) Oral two times a day  multivitamin 1 Tablet(s) Oral daily  pantoprazole    Tablet 40 milliGRAM(s) Oral daily    MEDICATIONS  (PRN):  aluminum hydroxide/magnesium hydroxide/simethicone Suspension 30 milliLiter(s) Oral every 6 hours PRN Dyspepsia  famotidine Injectable 20 milliGRAM(s) IV Push two times a day PRN Dyspepsia if Maalox does not work    Allergies:  sulfa drugs (Unknown)    REVIEW OF SYSTEMS:  CONSTITUTIONAL: No weakness, fevers or chills  Eyes: No visual changes  NECK: No pain or stiffness  RESPIRATORY: No cough, wheezing, hemoptysis; No shortness of breath  CARDIOVASCULAR:  + chest pain   GASTROINTESTINAL: No nausea, vomiting, or hematemesis; No diarrhea or constipation. No melena or hematochezia.  GENITOURINARY: No dysuria, frequency or hematuria  NEUROLOGICAL: No numbness.  SKIN: No itching or rash  All other review of systems is negative unless indicated above    VITAL SIGNS:   Vital Signs Last 24 Hrs  T(C): 36.4 (02 Feb 2021 06:50), Max: 37.4 (01 Feb 2021 14:22)  T(F): 97.6 (02 Feb 2021 06:50), Max: 99.4 (01 Feb 2021 14:22)  HR: 61 (02 Feb 2021 06:50) (61 - 78)  BP: 130/85 (02 Feb 2021 06:50) (127/62 - 165/84)  BP(mean): 100 (02 Feb 2021 06:50) (90 - 100)  RR: 15 (02 Feb 2021 06:50) (15 - 18)  SpO2: 98% (02 Feb 2021 06:50) (94% - 100%)    PHYSICAL EXAM:  Constitutional: NAD, awake and alert  HEENT:  Wearing a facemask   Pulmonary: Non-labored, breath sounds are clear bilaterally, No wheezing, rales or rhonchi  Cardiovascular: S1 and S2, regular rate and rhythm  Gastrointestinal: Bowel Sounds present, soft, nontender.   Lymph: No peripheral edema. No cervical lymphadenopathy.  Neurological: Alert, no focal deficits  Skin: No rashes.  Psych:  Mood & affect appropriate    LABS:                   15.8   7.70  )-----------( 191      ( 01 Feb 2021 14:41 )             44.9     133    |  98     |  22     ----------------------------<  141    3.4     |  29     |  1.20     CARDIAC MARKERS ( 01 Feb 2021 21:37 ) <0.015 ng/mL / x     / x     / x     / x      CARDIAC MARKERS ( 01 Feb 2021 17:42 ) <0.015 ng/mL / x     / x     / x     / x      CARDIAC MARKERS ( 01 Feb 2021 14:41 ) <0.015 ng/mL / x     / x     / x     / x        Pro Bnp 35    ECG: Sinus rhythm    Xray Chest 1 View- PORTABLE-Urgent (02.01.21 @ 14:53): The lungs are clear of airspace consolidations or effusions. No pneumothorax. The heart and mediastinum are within normal limits.

## 2021-02-02 NOTE — PROGRESS NOTE ADULT - SUBJECTIVE AND OBJECTIVE BOX
79 yo M with a PMH carotid stenosis, prostate cancer, anxiety, HTN, and HLD who presents with chest pain. This morning, he developed subxiphoid upper abdominal/lower chest pain radiating up his chest into his left shoulder, achy. He denies any associated SOB, wheezing, or palpitations. He also denies nausea, vomiting, diarrhea, blood in his stool, urinary symptoms, rash, swelling, fevers, or chills. His pain improved after a short while, and after he ate, the pain came back again and lasted longer, so he decided to go to the ED.  He notes a chronic nonproductive cough, that has not worsened recently.  He also notes occasional lightheadedness when he stands up, ever since his HCTZ dose was doubled.    Of note, he was recently diagnosed with prostate cancer, [place] score of 6, and limited, non-metastatic.    Prior cardiologist was Dr. Hicks, who retired.    In the ED, he received Famotidine 20 mg IV x1 and Maalox 30 ml PO x1.    2/2-afebrile,   GENERAL: No acute distress  HEENT: PERRL, EOMI, MMM, no oropharyngeal lesions  NECK: supple, no stiffness, no JVD, no thyromegaly  PULM: respirations non-labored, clear to auscultation bilaterally, no rales, rhonchi, or wheezes  CV: regular rate and rhythm, no murmurs, gallops, or rubs  GI: abdomen soft, nontender, nondistended, no masses felt, normal bowel sounds  MSK: strength 5/5 bilateral upper/lower extremities. No joint swelling, erythema, or warmth.  LYMPH: no anterior cervical, posterior cervical, supraclavicular, or inguinal lymphadenopathy  NEURO: A&Ox3, no tremors, sensation intact  SKIN: no rashes, lesions, or edema      PHYSICAL EXAM:    Daily Height in cm: 167.64 (01 Feb 2021 14:22)    Daily     ICU Vital Signs Last 24 Hrs  T(C): 36.4 (02 Feb 2021 06:50), Max: 37.4 (01 Feb 2021 14:22)  T(F): 97.6 (02 Feb 2021 06:50), Max: 99.4 (01 Feb 2021 14:22)  HR: 61 (02 Feb 2021 06:50) (61 - 78)  BP: 130/85 (02 Feb 2021 06:50) (127/62 - 165/84)  BP(mean): 100 (02 Feb 2021 06:50) (90 - 100)  ABP: --  ABP(mean): --  RR: 15 (02 Feb 2021 06:50) (15 - 18)  SpO2: 98% (02 Feb 2021 06:50) (94% - 100%)                                15.8   7.70  )-----------( 191      ( 01 Feb 2021 14:41 )             44.9       CBC Full  -  ( 01 Feb 2021 14:41 )  WBC Count : 7.70 K/uL  RBC Count : 4.88 M/uL  Hemoglobin : 15.8 g/dL  Hematocrit : 44.9 %  Platelet Count - Automated : 191 K/uL  Mean Cell Volume : 92.0 fl  Mean Cell Hemoglobin : 32.4 pg  Mean Cell Hemoglobin Concentration : 35.2 gm/dL  Auto Neutrophil # : 5.64 K/uL  Auto Lymphocyte # : 1.52 K/uL  Auto Monocyte # : 0.48 K/uL  Auto Eosinophil # : 0.02 K/uL  Auto Basophil # : 0.03 K/uL  Auto Neutrophil % : 73.3 %  Auto Lymphocyte % : 19.7 %  Auto Monocyte % : 6.2 %  Auto Eosinophil % : 0.3 %  Auto Basophil % : 0.4 %      02-01    133<L>  |  98  |  22  ----------------------------<  141<H>  3.4<L>   |  29  |  1.20    Ca    8.9      01 Feb 2021 14:41  Mg     2.3     02-01    TPro  6.9  /  Alb  3.5  /  TBili  0.8  /  DBili  x   /  AST  25  /  ALT  50  /  AlkPhos  70  02-01      LIVER FUNCTIONS - ( 01 Feb 2021 14:41 )  Alb: 3.5 g/dL / Pro: 6.9 gm/dL / ALK PHOS: 70 U/L / ALT: 50 U/L / AST: 25 U/L / GGT: x                 CARDIAC MARKERS ( 01 Feb 2021 21:37 )  <0.015 ng/mL / x     / x     / x     / x      CARDIAC MARKERS ( 01 Feb 2021 17:42 )  <0.015 ng/mL / x     / x     / x     / x      CARDIAC MARKERS ( 01 Feb 2021 14:41 )  <0.015 ng/mL / x     / x     / x     / x                    MEDICATIONS  (STANDING):  amitriptyline 30 milliGRAM(s) Oral at bedtime  amLODIPine   Tablet 10 milliGRAM(s) Oral daily  atorvastatin 10 milliGRAM(s) Oral at bedtime  cholecalciferol 1000 Unit(s) Oral daily  clopidogrel Tablet 75 milliGRAM(s) Oral daily  heparin   Injectable 5000 Unit(s) SubCutaneous every 12 hours  hydrochlorothiazide 25 milliGRAM(s) Oral daily  losartan 100 milliGRAM(s) Oral daily  melatonin 5 milliGRAM(s) Oral at bedtime  metoprolol succinate ER 25 milliGRAM(s) Oral two times a day  multivitamin 1 Tablet(s) Oral daily  pantoprazole    Tablet 40 milliGRAM(s) Oral daily       79 yo M with a PMH carotid stenosis, prostate cancer, anxiety, HTN, and HLD who presents with chest pain. This morning, he developed subxiphoid upper abdominal/lower chest pain radiating up his chest into his left shoulder, achy. He denies any associated SOB, wheezing, or palpitations. He also denies nausea, vomiting, diarrhea, blood in his stool, urinary symptoms, rash, swelling, fevers, or chills. His pain improved after a short while, and after he ate, the pain came back again and lasted longer, so he decided to go to the ED.  He notes a chronic nonproductive cough, that has not worsened recently.  He also notes occasional lightheadedness when he stands up, ever since his HCTZ dose was doubled.    Of note, he was recently diagnosed with prostate cancer, [place] score of 6, and limited, non-metastatic.    Prior cardiologist was Dr. Hicks, who retired.    In the ED, he received Famotidine 20 mg IV x1 and Maalox 30 ml PO x1.    2/2-afebrile, no chest pain , felt dizzy on standing up and while urinating, did not fall  GENERAL: No acute distress  HEENT: PERRL, EOMI, MMM, no oropharyngeal lesions  NECK: supple, no stiffness, no JVD, no thyromegaly  PULM: respirations non-labored, clear to auscultation bilaterally, no rales, rhonchi, or wheezes  CV: regular rate and rhythm, no murmurs, gallops, or rubs  GI: abdomen soft, nontender, nondistended, no masses felt, normal bowel sounds  MSK: strength 5/5 bilateral upper/lower extremities. No joint swelling, erythema, or warmth.  LYMPH: no anterior cervical, posterior cervical, supraclavicular, or inguinal lymphadenopathy  NEURO: A&Ox3, no tremors, sensation intact  SKIN: no rashes, lesions, or edema      PHYSICAL EXAM:    Daily Height in cm: 167.64 (01 Feb 2021 14:22)    Daily     ICU Vital Signs Last 24 Hrs  T(C): 36.4 (02 Feb 2021 06:50), Max: 37.4 (01 Feb 2021 14:22)  T(F): 97.6 (02 Feb 2021 06:50), Max: 99.4 (01 Feb 2021 14:22)  HR: 61 (02 Feb 2021 06:50) (61 - 78)  BP: 130/85 (02 Feb 2021 06:50) (127/62 - 165/84)  BP(mean): 100 (02 Feb 2021 06:50) (90 - 100)  ABP: --  ABP(mean): --  RR: 15 (02 Feb 2021 06:50) (15 - 18)  SpO2: 98% (02 Feb 2021 06:50) (94% - 100%)                                15.8   7.70  )-----------( 191      ( 01 Feb 2021 14:41 )             44.9       CBC Full  -  ( 01 Feb 2021 14:41 )  WBC Count : 7.70 K/uL  RBC Count : 4.88 M/uL  Hemoglobin : 15.8 g/dL  Hematocrit : 44.9 %  Platelet Count - Automated : 191 K/uL  Mean Cell Volume : 92.0 fl  Mean Cell Hemoglobin : 32.4 pg  Mean Cell Hemoglobin Concentration : 35.2 gm/dL  Auto Neutrophil # : 5.64 K/uL  Auto Lymphocyte # : 1.52 K/uL  Auto Monocyte # : 0.48 K/uL  Auto Eosinophil # : 0.02 K/uL  Auto Basophil # : 0.03 K/uL  Auto Neutrophil % : 73.3 %  Auto Lymphocyte % : 19.7 %  Auto Monocyte % : 6.2 %  Auto Eosinophil % : 0.3 %  Auto Basophil % : 0.4 %      02-01    133<L>  |  98  |  22  ----------------------------<  141<H>  3.4<L>   |  29  |  1.20    Ca    8.9      01 Feb 2021 14:41  Mg     2.3     02-01    TPro  6.9  /  Alb  3.5  /  TBili  0.8  /  DBili  x   /  AST  25  /  ALT  50  /  AlkPhos  70  02-01      LIVER FUNCTIONS - ( 01 Feb 2021 14:41 )  Alb: 3.5 g/dL / Pro: 6.9 gm/dL / ALK PHOS: 70 U/L / ALT: 50 U/L / AST: 25 U/L / GGT: x                 CARDIAC MARKERS ( 01 Feb 2021 21:37 )  <0.015 ng/mL / x     / x     / x     / x      CARDIAC MARKERS ( 01 Feb 2021 17:42 )  <0.015 ng/mL / x     / x     / x     / x      CARDIAC MARKERS ( 01 Feb 2021 14:41 )  <0.015 ng/mL / x     / x     / x     / x                    MEDICATIONS  (STANDING):  amitriptyline 30 milliGRAM(s) Oral at bedtime  amLODIPine   Tablet 10 milliGRAM(s) Oral daily  atorvastatin 10 milliGRAM(s) Oral at bedtime  cholecalciferol 1000 Unit(s) Oral daily  clopidogrel Tablet 75 milliGRAM(s) Oral daily  heparin   Injectable 5000 Unit(s) SubCutaneous every 12 hours  hydrochlorothiazide 25 milliGRAM(s) Oral daily  losartan 100 milliGRAM(s) Oral daily  melatonin 5 milliGRAM(s) Oral at bedtime  metoprolol succinate ER 25 milliGRAM(s) Oral two times a day  multivitamin 1 Tablet(s) Oral daily  pantoprazole    Tablet 40 milliGRAM(s) Oral daily

## 2021-02-03 ENCOUNTER — TRANSCRIPTION ENCOUNTER (OUTPATIENT)
Age: 81
End: 2021-02-03

## 2021-02-03 VITALS
OXYGEN SATURATION: 98 % | RESPIRATION RATE: 17 BRPM | DIASTOLIC BLOOD PRESSURE: 75 MMHG | TEMPERATURE: 98 F | SYSTOLIC BLOOD PRESSURE: 139 MMHG | HEART RATE: 67 BPM

## 2021-02-03 LAB
ANION GAP SERPL CALC-SCNC: 2 MMOL/L — LOW (ref 5–17)
BUN SERPL-MCNC: 20 MG/DL — SIGNIFICANT CHANGE UP (ref 7–23)
CALCIUM SERPL-MCNC: 9.1 MG/DL — SIGNIFICANT CHANGE UP (ref 8.5–10.1)
CHLORIDE SERPL-SCNC: 102 MMOL/L — SIGNIFICANT CHANGE UP (ref 96–108)
CO2 SERPL-SCNC: 33 MMOL/L — HIGH (ref 22–31)
CREAT SERPL-MCNC: 1.11 MG/DL — SIGNIFICANT CHANGE UP (ref 0.5–1.3)
GLUCOSE SERPL-MCNC: 97 MG/DL — SIGNIFICANT CHANGE UP (ref 70–99)
POTASSIUM SERPL-MCNC: 4.3 MMOL/L — SIGNIFICANT CHANGE UP (ref 3.5–5.3)
POTASSIUM SERPL-SCNC: 4.3 MMOL/L — SIGNIFICANT CHANGE UP (ref 3.5–5.3)
SODIUM SERPL-SCNC: 137 MMOL/L — SIGNIFICANT CHANGE UP (ref 135–145)

## 2021-02-03 PROCEDURE — 99233 SBSQ HOSP IP/OBS HIGH 50: CPT

## 2021-02-03 PROCEDURE — 99239 HOSP IP/OBS DSCHRG MGMT >30: CPT

## 2021-02-03 RX ORDER — ATORVASTATIN CALCIUM 80 MG/1
1 TABLET, FILM COATED ORAL
Qty: 0 | Refills: 0 | DISCHARGE
Start: 2021-02-03

## 2021-02-03 RX ORDER — ASPIRIN/CALCIUM CARB/MAGNESIUM 324 MG
1 TABLET ORAL
Qty: 0 | Refills: 0 | DISCHARGE
Start: 2021-02-03

## 2021-02-03 RX ORDER — AMLODIPINE BESYLATE 2.5 MG/1
1 TABLET ORAL
Qty: 0 | Refills: 0 | DISCHARGE

## 2021-02-03 RX ADMIN — HEPARIN SODIUM 5000 UNIT(S): 5000 INJECTION INTRAVENOUS; SUBCUTANEOUS at 09:32

## 2021-02-03 RX ADMIN — Medication 1 TABLET(S): at 09:32

## 2021-02-03 NOTE — DISCHARGE NOTE PROVIDER - HOSPITAL COURSE
79 yo M with a PMH carotid stenosis, prostate cancer, anxiety, HTN, and HLD who presents with chest pain. This morning, he developed subxiphoid upper abdominal/lower chest pain radiating up his chest into his left shoulder, achy. He denies any associated SOB, wheezing, or palpitations. He also denies nausea, vomiting, diarrhea, blood in his stool, urinary symptoms, rash, swelling, fevers, or chills. His pain improved after a short while, and after he ate, the pain came back again and lasted longer, so he decided to go to the ED.  He notes a chronic nonproductive cough, that has not worsened recently.  He also notes occasional lightheadedness when he stands up, ever since his HCTZ dose was doubled.    Of note, he was recently diagnosed with prostate cancer, [place] score of 6, and limited, non-metastatic.    Prior cardiologist was Dr. Hicks, who retired.    In the ED, he received Famotidine 20 mg IV x1 and Maalox 30 ml PO x1.    -pt was admitted due to atypical chest pain  1. Atypical chest pain--> Due to GERD  -report epigastric pain/heartburn. Pt has follow up appointment with GI Dr Madrigal (to consider endoscopy/colonoscopy)  -states had prior endoscopy/colonosocpy which was normal. On home pantoprazole.  - Cardiology consulted-->cardiac ischemic work up negative  - Troponin negative x3  - started with aspirin  - on Clopidogrel and statin  - Stress test -No scan evidence of reversible or fixed perfusion defects. Normal left ventricular contractility with an ejection fraction of 71% (Normal: 50% or greater).    2) GERD--report epigastric pain/heartburn. Pt has follow up with GI Dr Madrigal (to consider endoscopy/colonoscopy)  - C/w PPI    3) HTN---> with orthostatic hypotension--> asymptomatic today.   - Adjusted BP meds and pt instructed to monitor BP at home and follow up with PMD  - Discontinue Amlodipine 10 mg QD  - - decrease HCTZ from 25 to 12.5mg daily  - C/w Metoprolol succinate 25 mg BID  - C/w Losartan equivalent dose of Irbesartan 300 mg QD    4) HLD  - C/w Zetia     5) Anxiety  - C/w Amitriptyline 30 mg QHS  - C/w Melatonin 3mg QHS    6) Hypokalemia  -repleted--> resolved    Discharge ROS/PE  ROS: no chest pain, dizziness, headaches, no blurry vision  Vital Signs Last 24 Hrs  T(C): 36.7 (03 Feb 2021 08:07), Max: 36.7 (02 Feb 2021 14:34)  T(F): 98.1 (03 Feb 2021 08:07), Max: 98.1 (03 Feb 2021 08:07)  HR: 67 (03 Feb 2021 08:07) (67 - 73)  BP: 139/75 (03 Feb 2021 08:07) (119/91 - 144/87)  BP(mean): --  RR: 17 (03 Feb 2021 08:07) (16 - 17)  SpO2: 98% (03 Feb 2021 08:07) (96% - 100%)  PHYSICAL EXAM:    GENERAL: NAD, well-groomed, well-developed  HEAD:  Atraumatic, Normocephalic  EYES: EOMI, PERRLA, conjunctiva and sclera clear  HEENT: Moist mucous membranes  NECK: Supple, No JVD  CHEST/LUNG: Clear to auscultation bilaterally; No rales, rhonchi, wheezing, or rubs  HEART: Regular rate and rhythm; No murmurs, rubs, or gallops  ABDOMEN: Soft, Nontender, Nondistended; Bowel sounds present  GENITOURINARY: Voiding, no palpable bladder  EXTREMITIES:  2+ Peripheral Pulses, No clubbing, cyanosis, or edema  MUSCULOSKELTAL- No muscle tenderness, Muscle tone normal, No joint tenderness, no Joint swelling, Joint range of motion-normal  SKIN- no rash or ulcer  NERVOUS SYSTEM:  Awake and alert, oriented x3, CNII-XII intact;  no focal deficits    CARDIAC MARKERS ( 01 Feb 2021 21:37 )  <0.015 ng/mL / x     / x     / x     / x      CARDIAC MARKERS ( 01 Feb 2021 17:42 )  <0.015 ng/mL / x     / x     / x     / x      CARDIAC MARKERS ( 01 Feb 2021 14:41 )  <0.015 ng/mL / x     / x     / x     / x        RADIOLOGY/EKG:  Diagnosis Line Normal sinus rhythm  Cannot rule out Anteroseptal infarct , age undetermined  Nonspecific T wave abnormality  Abnormal ECG    ECHO/CARDIAC CATHTERIZATION/STRESS TEST:  < from: NM Nuclear Stress Pharmacologic Multiple (02.02.21 @ 11:50) >  MPRESSION: Normal SPECT Myocardial Perfusion Imaging.    No scan evidence of reversible or fixed perfusion defects.    Normal left ventricular contractility with an ejection fraction of 71% (Normal: 50% or greater).    No regional wall motion abnormalities. 81 yo M with a PMH carotid stenosis, prostate cancer, anxiety, HTN, and HLD who presents with chest pain. This morning, he developed subxiphoid upper abdominal/lower chest pain radiating up his chest into his left shoulder, achy. He denies any associated SOB, wheezing, or palpitations. He also denies nausea, vomiting, diarrhea, blood in his stool, urinary symptoms, rash, swelling, fevers, or chills. His pain improved after a short while, and after he ate, the pain came back again and lasted longer, so he decided to go to the ED.  He notes a chronic nonproductive cough, that has not worsened recently.  He also notes occasional lightheadedness when he stands up, ever since his HCTZ dose was doubled.    Of note, he was recently diagnosed with prostate cancer, [place] score of 6, and limited, non-metastatic.    Prior cardiologist was Dr. Hicks, who retired.    In the ED, he received Famotidine 20 mg IV x1 and Maalox 30 ml PO x1.    -pt was admitted due to atypical chest pain  1. Atypical chest pain--> Due to GERD  -report epigastric pain/heartburn. Pt has follow up appointment with GI Dr Madrigal (to consider endoscopy/colonoscopy)  -states had prior endoscopy/colonosocpy which was normal. On home pantoprazole.  - Cardiology consulted-->cardiac ischemic work up negative  - Troponin negative x3  - on Clopidogrel and statin  - Stress test -No scan evidence of reversible or fixed perfusion defects. Normal left ventricular contractility with an ejection fraction of 71% (Normal: 50% or greater).    2) GERD--report epigastric pain/heartburn. Pt has follow up with GI Dr Madrigal (to consider endoscopy/colonoscopy)  - C/w PPI    3) HTN---> with orthostatic hypotension--> asymptomatic today.   - Adjusted BP meds and pt instructed to monitor BP at home and follow up with PMD  - Discontinue Amlodipine   - - decrease HCTZ from 25 to 12.5mg daily  - C/w Metoprolol succinate 25 mg BID  - C/w Losartan equivalent dose of Irbesartan 300 mg QD    4) HLD  - C/w Zetia     5) Anxiety  - C/w Amitriptyline 30 mg QHS  - C/w Melatonin 3mg QHS    6) Hypokalemia  -repleted--> resolved    Patient is stable for discharge to home  - Adjusted BP meds and pt instructed to monitor BP at home and follow up with PMD  - Discontinue Amlodipine. Decrease HCTZ from 25 to 12.5mg daily  -Follow up with GI Dr. Madrigal     Discharge ROS/PE  ROS: no chest pain, dizziness, headaches, no blurry vision  Vital Signs Last 24 Hrs  T(C): 36.7 (03 Feb 2021 08:07), Max: 36.7 (02 Feb 2021 14:34)  T(F): 98.1 (03 Feb 2021 08:07), Max: 98.1 (03 Feb 2021 08:07)  HR: 67 (03 Feb 2021 08:07) (67 - 73)  BP: 139/75 (03 Feb 2021 08:07) (119/91 - 144/87)  BP(mean): --  RR: 17 (03 Feb 2021 08:07) (16 - 17)  SpO2: 98% (03 Feb 2021 08:07) (96% - 100%)  PHYSICAL EXAM:    GENERAL: NAD, well-groomed, well-developed  HEAD:  Atraumatic, Normocephalic  EYES: EOMI, PERRLA, conjunctiva and sclera clear  HEENT: Moist mucous membranes  NECK: Supple, No JVD  CHEST/LUNG: Clear to auscultation bilaterally; No rales, rhonchi, wheezing, or rubs  HEART: Regular rate and rhythm; No murmurs, rubs, or gallops  ABDOMEN: Soft, Nontender, Nondistended; Bowel sounds present  GENITOURINARY: Voiding, no palpable bladder  EXTREMITIES:  2+ Peripheral Pulses, No clubbing, cyanosis, or edema  MUSCULOSKELTAL- No muscle tenderness, Muscle tone normal, No joint tenderness, no Joint swelling, Joint range of motion-normal  SKIN- no rash or ulcer  NERVOUS SYSTEM:  Awake and alert, oriented x3, CNII-XII intact;  no focal deficits    CARDIAC MARKERS ( 01 Feb 2021 21:37 )  <0.015 ng/mL / x     / x     / x     / x      CARDIAC MARKERS ( 01 Feb 2021 17:42 )  <0.015 ng/mL / x     / x     / x     / x      CARDIAC MARKERS ( 01 Feb 2021 14:41 )  <0.015 ng/mL / x     / x     / x     / x        RADIOLOGY/EKG:  Diagnosis Line Normal sinus rhythm  Cannot rule out Anteroseptal infarct , age undetermined  Nonspecific T wave abnormality  Abnormal ECG    ECHO/CARDIAC CATHTERIZATION/STRESS TEST:  < from: NM Nuclear Stress Pharmacologic Multiple (02.02.21 @ 11:50) >  MPRESSION: Normal SPECT Myocardial Perfusion Imaging.    No scan evidence of reversible or fixed perfusion defects.    Normal left ventricular contractility with an ejection fraction of 71% (Normal: 50% or greater).    No regional wall motion abnormalities.

## 2021-02-03 NOTE — DISCHARGE NOTE PROVIDER - NSDCMRMEDTOKEN_GEN_ALL_CORE_FT
aluminum hydroxide-magnesium hydroxide 200 mg-200 mg/5 mL oral suspension: 30 milliliter(s) orally every 6 hours, As needed, Dyspepsia  amitriptyline 10 mg oral tablet: 3 tab(s) orally once a day (at bedtime)  aspirin 81 mg oral delayed release tablet: 1 tab(s) orally once a day  atorvastatin 10 mg oral tablet: 1 tab(s) orally once a day (at bedtime)  Co Q-10: 200 milligram(s) orally once a day  hydroCHLOROthiazide 12.5 mg oral capsule: 1 cap(s) orally once a day  irbesartan 300 mg oral tablet: 1 tab(s) orally once a day  Livalo 2 mg oral tablet: 1 tab(s) orally once a day (at bedtime)  melatonin 5 mg oral tablet: 1 tab(s) orally once a day (at bedtime)  metoprolol succinate 25 mg oral tablet, extended release: 1 tab(s) orally 2 times a day  multivitamin: 1 tab(s) orally once a day  pantoprazole 40 mg oral delayed release tablet: 1 tab(s) orally once a day  Plavix 75 mg oral tablet: 1 tab(s) orally once a day  Vitamin D3 1000 intl units oral capsule: 1 cap(s) orally once a day  Zetia 10 mg oral tablet: 1 tab(s) orally once a day (at bedtime)   aluminum hydroxide-magnesium hydroxide 200 mg-200 mg/5 mL oral suspension: 30 milliliter(s) orally every 6 hours, As needed, Dyspepsia  amitriptyline 10 mg oral tablet: 3 tab(s) orally once a day (at bedtime)  atorvastatin 10 mg oral tablet: 1 tab(s) orally once a day (at bedtime)  Co Q-10: 200 milligram(s) orally once a day  hydroCHLOROthiazide 12.5 mg oral capsule: 1 cap(s) orally once a day  irbesartan 300 mg oral tablet: 1 tab(s) orally once a day  Livalo 2 mg oral tablet: 1 tab(s) orally once a day (at bedtime)  melatonin 5 mg oral tablet: 1 tab(s) orally once a day (at bedtime)  metoprolol succinate 25 mg oral tablet, extended release: 1 tab(s) orally 2 times a day  multivitamin: 1 tab(s) orally once a day  pantoprazole 40 mg oral delayed release tablet: 1 tab(s) orally once a day  Plavix 75 mg oral tablet: 1 tab(s) orally once a day  Vitamin D3 1000 intl units oral capsule: 1 cap(s) orally once a day  Zetia 10 mg oral tablet: 1 tab(s) orally once a day (at bedtime)

## 2021-02-03 NOTE — DISCHARGE NOTE NURSING/CASE MANAGEMENT/SOCIAL WORK - PATIENT PORTAL LINK FT
You can access the FollowMyHealth Patient Portal offered by [facility] by registering at the following website: [email or web address]. By joining [employer]’s FollowMyHealth portal, you will also be able to view your health information using other applications (apps) compatible with our system.

## 2021-02-03 NOTE — DISCHARGE NOTE PROVIDER - NSDCCPCAREPLAN_GEN_ALL_CORE_FT
PRINCIPAL DISCHARGE DIAGNOSIS  Diagnosis: Chest pain, unspecified type  Assessment and Plan of Treatment: due to GERD  continue PPI  follow up GI outpatient

## 2021-02-03 NOTE — PROGRESS NOTE ADULT - SUBJECTIVE AND OBJECTIVE BOX
PCP:    REQUESTING PHYSICIAN:    REASON FOR CONSULT:    CHIEF COMPLAINT:    HPI:  79 yo M with a PMH carotid stenosis, prostate cancer, anxiety, HTN, and HLD who presents with chest pain. This morning, he developed subxiphoid upper abdominal/lower chest pain radiating up his chest into his left shoulder, achy. He denies any associated SOB, wheezing, or palpitations. He also denies nausea, vomiting, diarrhea, blood in his stool, urinary symptoms, rash, swelling, fevers, or chills. His pain improved after a short while, and after he ate, the pain came back again and lasted longer, so he decided to go to the ED.  He notes a chronic nonproductive cough, that has not worsened recently.  He also notes occasional lightheadedness when he stands up, ever since his HCTZ dose was doubled.    Of note, he was recently diagnosed with prostate cancer, Jace score of 6, and limited, non-metastatic.    Prior cardiologist was Dr. Hicks, who retired.  2/3/21: Pt denies chest pain but reports epigastric and abdominal discomfort.       PAST MEDICAL & SURGICAL HISTORY:  Anxiety    Prostate cancer    History of carotid stenosis    Hyperlipidemia    Basal cell carcinoma (BCC), unspecified site    Diverticulitis    Essential hypertension    H/O transurethral resection of prostate      H/O bilateral inguinal hernia repair      H/O hemorrhoidectomy      History of carotid endarterectomy    Celiac artery stenosis        SOCIAL HISTORY:    FAMILY HISTORY:  Family history of colon cancer in father    Family history of CHF (congestive heart failure)  father        ALLERGIES:  Allergies    sulfa drugs (Unknown)    Intolerances        MEDICATIONS:    MEDICATIONS  (STANDING):  amitriptyline 30 milliGRAM(s) Oral at bedtime  aspirin enteric coated 81 milliGRAM(s) Oral daily  atorvastatin 10 milliGRAM(s) Oral at bedtime  cholecalciferol 1000 Unit(s) Oral daily  heparin   Injectable 5000 Unit(s) SubCutaneous every 12 hours  losartan 100 milliGRAM(s) Oral daily  melatonin 5 milliGRAM(s) Oral at bedtime  metoprolol succinate ER 25 milliGRAM(s) Oral daily  multivitamin 1 Tablet(s) Oral daily  pantoprazole    Tablet 40 milliGRAM(s) Oral daily    MEDICATIONS  (PRN):  aluminum hydroxide/magnesium hydroxide/simethicone Suspension 30 milliLiter(s) Oral every 6 hours PRN Dyspepsia  famotidine Injectable 20 milliGRAM(s) IV Push two times a day PRN Dyspepsia if Maalox does not work        Vital Signs Last 24 Hrs  T(C): 36.7 (2021 08:07), Max: 36.7 (2021 14:34)  T(F): 98.1 (2021 08:07), Max: 98.1 (2021 08:07)  HR: 67 (2021 08:07) (67 - 73)  BP: 139/75 (2021 08:07) (119/91 - 144/87)  BP(mean): --  RR: 17 (2021 08:07) (16 - 17)  SpO2: 98% (2021 08:07) (96% - 100%)Daily     Daily Weight in k.8 (2021 15:31)I&O's Summary      PHYSICAL EXAM:    Constitutional: NAD, awake and alert, well-developed  HEENT: PERR, EOMI,  No oral cyananosis.  Neck:  supple,  No JVD  Respiratory: Breath sounds are clear bilaterally, No wheezing, rales or rhonchi  Cardiovascular: S1 and S2, regular rate and rhythm, no Murmurs, gallops or rubs  Gastrointestinal: Bowel Sounds present, soft, nontender.   Extremities: No peripheral edema. No clubbing or cyanosis.  Vascular: 2+ peripheral pulses  Neurological: A/O x 3, no focal deficits  Musculoskeletal: no calf tenderness.  Skin: No rashes.      LABS: All Labs Reviewed:                        15.8   7.70  )-----------( 191      ( 2021 14:41 )             44.9     2021 07:43    137    |  102    |  20     ----------------------------<  97     4.3     |  33     |  1.11   2021 08:39    136    |  102    |  18     ----------------------------<  109    3.6     |  31     |  1.09   2021 14:41    133    |  98     |  22     ----------------------------<  141    3.4     |  29     |  1.20     Ca    9.1        2021 07:43  Ca    8.9        2021 08:39  Ca    8.9        2021 14:41  Mg     2.3       2021 14:41    TPro  6.9    /  Alb  3.5    /  TBili  0.8    /  DBili  x      /  AST  25     /  ALT  50     /  AlkPhos  70     2021 14:41      CARDIAC MARKERS ( 2021 21:37 )  <0.015 ng/mL / x     / x     / x     / x      CARDIAC MARKERS ( 2021 17:42 )  <0.015 ng/mL / x     / x     / x     / x      CARDIAC MARKERS ( 2021 14:41 )  <0.015 ng/mL / x     / x     / x     / x          Blood Culture:    @ 14:41  Pro Bnp 35        RADIOLOGY/EKG:  < from: 12 Lead ECG (21 @ 14:34) >  Diagnosis Line Normal sinus rhythm  Cannot rule out Anteroseptal infarct , age undetermined  Nonspecific T wave abnormality  Abnormal ECG  Confirmed by EDMOND MUELLER MD ([id number]) on 2021 12:03:19 PM    < end of copied text >      ECHO/CARDIAC CATHTERIZATION/STRESS TEST:  < from: NM Nuclear Stress Pharmacologic Multiple (21 @ 11:50) >  MPRESSION: Normal SPECT Myocardial Perfusion Imaging.    No scan evidence of reversible or fixed perfusion defects.    Normal left ventricular contractility with an ejection fraction of 71% (Normal: 50% or greater).    No regional wall motion abnormalities.    < end of copied text >

## 2021-02-04 PROBLEM — C61 MALIGNANT NEOPLASM OF PROSTATE: Chronic | Status: ACTIVE | Noted: 2021-02-01

## 2021-02-04 PROBLEM — Z86.79 PERSONAL HISTORY OF OTHER DISEASES OF THE CIRCULATORY SYSTEM: Chronic | Status: ACTIVE | Noted: 2021-02-01

## 2021-02-04 PROBLEM — E78.5 HYPERLIPIDEMIA, UNSPECIFIED: Chronic | Status: ACTIVE | Noted: 2021-02-01

## 2021-02-04 PROBLEM — F41.9 ANXIETY DISORDER, UNSPECIFIED: Chronic | Status: ACTIVE | Noted: 2021-02-01

## 2021-02-08 DIAGNOSIS — Z87.891 PERSONAL HISTORY OF NICOTINE DEPENDENCE: ICD-10-CM

## 2021-02-08 DIAGNOSIS — K21.9 GASTRO-ESOPHAGEAL REFLUX DISEASE WITHOUT ESOPHAGITIS: ICD-10-CM

## 2021-02-08 DIAGNOSIS — Z85.46 PERSONAL HISTORY OF MALIGNANT NEOPLASM OF PROSTATE: ICD-10-CM

## 2021-02-08 DIAGNOSIS — E87.6 HYPOKALEMIA: ICD-10-CM

## 2021-02-08 DIAGNOSIS — Z88.2 ALLERGY STATUS TO SULFONAMIDES: ICD-10-CM

## 2021-02-08 DIAGNOSIS — I95.1 ORTHOSTATIC HYPOTENSION: ICD-10-CM

## 2021-02-08 DIAGNOSIS — Z79.02 LONG TERM (CURRENT) USE OF ANTITHROMBOTICS/ANTIPLATELETS: ICD-10-CM

## 2021-02-08 DIAGNOSIS — F41.9 ANXIETY DISORDER, UNSPECIFIED: ICD-10-CM

## 2021-02-08 DIAGNOSIS — I10 ESSENTIAL (PRIMARY) HYPERTENSION: ICD-10-CM

## 2021-02-08 DIAGNOSIS — C61 MALIGNANT NEOPLASM OF PROSTATE: ICD-10-CM

## 2021-02-08 DIAGNOSIS — Z85.828 PERSONAL HISTORY OF OTHER MALIGNANT NEOPLASM OF SKIN: ICD-10-CM

## 2021-02-08 DIAGNOSIS — E78.5 HYPERLIPIDEMIA, UNSPECIFIED: ICD-10-CM

## 2021-02-08 DIAGNOSIS — Z87.19 PERSONAL HISTORY OF OTHER DISEASES OF THE DIGESTIVE SYSTEM: ICD-10-CM

## 2021-02-08 DIAGNOSIS — R07.89 OTHER CHEST PAIN: ICD-10-CM

## 2021-03-04 ENCOUNTER — APPOINTMENT (OUTPATIENT)
Dept: CARDIOLOGY | Facility: CLINIC | Age: 81
End: 2021-03-04
Payer: MEDICARE

## 2021-03-04 VITALS
HEIGHT: 66 IN | BODY MASS INDEX: 26.03 KG/M2 | HEART RATE: 64 BPM | OXYGEN SATURATION: 96 % | SYSTOLIC BLOOD PRESSURE: 132 MMHG | WEIGHT: 162 LBS | DIASTOLIC BLOOD PRESSURE: 74 MMHG

## 2021-03-04 DIAGNOSIS — Z00.00 ENCOUNTER FOR GENERAL ADULT MEDICAL EXAMINATION W/OUT ABNORMAL FINDINGS: ICD-10-CM

## 2021-03-04 PROCEDURE — 99214 OFFICE O/P EST MOD 30 MIN: CPT

## 2021-03-04 PROCEDURE — 93000 ELECTROCARDIOGRAM COMPLETE: CPT

## 2021-03-04 RX ORDER — CHLORHEXIDINE GLUCONATE 4 %
5 LIQUID (ML) TOPICAL DAILY
Refills: 0 | Status: ACTIVE | COMMUNITY

## 2021-03-04 RX ORDER — CLOPIDOGREL 75 MG/1
75 TABLET, FILM COATED ORAL
Qty: 90 | Refills: 3 | Status: ACTIVE | COMMUNITY

## 2021-03-04 NOTE — HISTORY OF PRESENT ILLNESS
[FreeTextEntry1] : 79 yo male presents in follow up after hospitalization at . Pt experienced chest pain but a nuclear stress test was negative. Pt's wife had Covid. Pt denies palpitations and shortness of breath.

## 2021-03-04 NOTE — PHYSICAL EXAM
[General Appearance - Well Developed] : well developed [Normal Appearance] : normal appearance [Well Groomed] : well groomed [General Appearance - Well Nourished] : well nourished [No Deformities] : no deformities [General Appearance - In No Acute Distress] : no acute distress [Normal Conjunctiva] : the conjunctiva exhibited no abnormalities [Eyelids - No Xanthelasma] : the eyelids demonstrated no xanthelasmas [Normal Oral Mucosa] : normal oral mucosa [No Oral Pallor] : no oral pallor [No Oral Cyanosis] : no oral cyanosis [Normal Jugular Venous A Waves Present] : normal jugular venous A waves present [Normal Jugular Venous V Waves Present] : normal jugular venous V waves present [No Jugular Venous Mays A Waves] : no jugular venous mays A waves [Respiration, Rhythm And Depth] : normal respiratory rhythm and effort [Exaggerated Use Of Accessory Muscles For Inspiration] : no accessory muscle use [Auscultation Breath Sounds / Voice Sounds] : lungs were clear to auscultation bilaterally [Heart Rate And Rhythm] : heart rate and rhythm were normal [Heart Sounds] : normal S1 and S2 [Systolic grade ___/6] : A grade [unfilled]/6 systolic murmur was heard. [Abdomen Soft] : soft [Abdomen Tenderness] : non-tender [Abdomen Mass (___ Cm)] : no abdominal mass palpated [Abnormal Walk] : normal gait [Gait - Sufficient For Exercise Testing] : the gait was sufficient for exercise testing [Nail Clubbing] : no clubbing of the fingernails [Cyanosis, Localized] : no localized cyanosis [Petechial Hemorrhages (___cm)] : no petechial hemorrhages [Skin Color & Pigmentation] : normal skin color and pigmentation [] : no rash [No Venous Stasis] : no venous stasis [Skin Lesions] : no skin lesions [No Skin Ulcers] : no skin ulcer [No Xanthoma] : no  xanthoma was observed [Oriented To Time, Place, And Person] : oriented to person, place, and time [Affect] : the affect was normal [Mood] : the mood was normal [No Anxiety] : not feeling anxious

## 2021-03-04 NOTE — DISCUSSION/SUMMARY
[Hyperlipidemia] : hyperlipidemia [Hypertension] : hypertension [Patient] : the patient [FreeTextEntry1] : Pt will continue current medications. He will have an Echo to evaluate valvulopathy.

## 2021-03-22 ENCOUNTER — NON-APPOINTMENT (OUTPATIENT)
Age: 81
End: 2021-03-22

## 2021-03-22 ENCOUNTER — APPOINTMENT (OUTPATIENT)
Dept: CARDIOLOGY | Facility: CLINIC | Age: 81
End: 2021-03-22
Payer: MEDICARE

## 2021-03-22 VITALS
WEIGHT: 168 LBS | OXYGEN SATURATION: 97 % | DIASTOLIC BLOOD PRESSURE: 72 MMHG | BODY MASS INDEX: 27 KG/M2 | HEIGHT: 66 IN | HEART RATE: 70 BPM | SYSTOLIC BLOOD PRESSURE: 128 MMHG

## 2021-03-22 PROCEDURE — 99214 OFFICE O/P EST MOD 30 MIN: CPT

## 2021-03-22 PROCEDURE — 93000 ELECTROCARDIOGRAM COMPLETE: CPT

## 2021-03-22 RX ORDER — AMITRIPTYLINE HYDROCHLORIDE 10 MG/1
10 TABLET, FILM COATED ORAL DAILY
Refills: 0 | Status: DISCONTINUED | COMMUNITY
End: 2021-03-22

## 2021-03-22 NOTE — HISTORY OF PRESENT ILLNESS
[FreeTextEntry1] : 79 yo male presents in follow up after hospitalization at . Pt experienced chest pain but a nuclear stress test was negative. Pt's wife had Covid. Pt denies palpitations and shortness of breath. \par Pt has discontinued amitriptyline and restarted amlodipine

## 2021-03-22 NOTE — DISCUSSION/SUMMARY
[Hyperlipidemia] : hyperlipidemia [Hypertension] : hypertension [Patient] : the patient [FreeTextEntry1] : Pt will take amlodipine 10 mg daily. He will discontinue amitriptyline. Echo is scheduled.

## 2021-03-31 ENCOUNTER — APPOINTMENT (OUTPATIENT)
Dept: CARDIOLOGY | Facility: CLINIC | Age: 81
End: 2021-03-31
Payer: MEDICARE

## 2021-03-31 PROCEDURE — 93306 TTE W/DOPPLER COMPLETE: CPT

## 2021-04-08 DIAGNOSIS — Z01.818 ENCOUNTER FOR OTHER PREPROCEDURAL EXAMINATION: ICD-10-CM

## 2021-04-09 ENCOUNTER — APPOINTMENT (OUTPATIENT)
Dept: DISASTER EMERGENCY | Facility: CLINIC | Age: 81
End: 2021-04-09

## 2021-04-10 LAB — SARS-COV-2 N GENE NPH QL NAA+PROBE: NOT DETECTED

## 2021-04-12 ENCOUNTER — RESULT REVIEW (OUTPATIENT)
Age: 81
End: 2021-04-12

## 2021-04-12 ENCOUNTER — OUTPATIENT (OUTPATIENT)
Dept: OUTPATIENT SERVICES | Facility: HOSPITAL | Age: 81
LOS: 1 days | Discharge: HOME | End: 2021-04-12
Payer: MEDICARE

## 2021-04-12 VITALS
WEIGHT: 154.1 LBS | DIASTOLIC BLOOD PRESSURE: 73 MMHG | OXYGEN SATURATION: 99 % | RESPIRATION RATE: 20 BRPM | SYSTOLIC BLOOD PRESSURE: 141 MMHG | HEART RATE: 68 BPM | HEIGHT: 66 IN | TEMPERATURE: 98 F

## 2021-04-12 DIAGNOSIS — K57.30 DIVERTICULOSIS OF LARGE INTESTINE WITHOUT PERFORATION OR ABSCESS WITHOUT BLEEDING: ICD-10-CM

## 2021-04-12 DIAGNOSIS — I77.4 CELIAC ARTERY COMPRESSION SYNDROME: Chronic | ICD-10-CM

## 2021-04-12 DIAGNOSIS — Z88.2 ALLERGY STATUS TO SULFONAMIDES: ICD-10-CM

## 2021-04-12 DIAGNOSIS — D12.3 BENIGN NEOPLASM OF TRANSVERSE COLON: ICD-10-CM

## 2021-04-12 DIAGNOSIS — Z12.11 ENCOUNTER FOR SCREENING FOR MALIGNANT NEOPLASM OF COLON: ICD-10-CM

## 2021-04-12 DIAGNOSIS — Z86.010 PERSONAL HISTORY OF COLONIC POLYPS: ICD-10-CM

## 2021-04-12 DIAGNOSIS — Z79.02 LONG TERM (CURRENT) USE OF ANTITHROMBOTICS/ANTIPLATELETS: ICD-10-CM

## 2021-04-12 DIAGNOSIS — Z98.890 OTHER SPECIFIED POSTPROCEDURAL STATES: Chronic | ICD-10-CM

## 2021-04-12 DIAGNOSIS — Z80.0 FAMILY HISTORY OF MALIGNANT NEOPLASM OF DIGESTIVE ORGANS: ICD-10-CM

## 2021-04-12 DIAGNOSIS — K44.9 DIAPHRAGMATIC HERNIA WITHOUT OBSTRUCTION OR GANGRENE: ICD-10-CM

## 2021-04-12 DIAGNOSIS — I65.29 OCCLUSION AND STENOSIS OF UNSPECIFIED CAROTID ARTERY: ICD-10-CM

## 2021-04-12 DIAGNOSIS — K21.00 GASTRO-ESOPHAGEAL REFLUX DISEASE WITH ESOPHAGITIS, WITHOUT BLEEDING: ICD-10-CM

## 2021-04-12 DIAGNOSIS — Z79.899 OTHER LONG TERM (CURRENT) DRUG THERAPY: ICD-10-CM

## 2021-04-12 DIAGNOSIS — K63.5 POLYP OF COLON: ICD-10-CM

## 2021-04-12 DIAGNOSIS — Z90.79 ACQUIRED ABSENCE OF OTHER GENITAL ORGAN(S): Chronic | ICD-10-CM

## 2021-04-12 DIAGNOSIS — K21.9 GASTRO-ESOPHAGEAL REFLUX DISEASE WITHOUT ESOPHAGITIS: ICD-10-CM

## 2021-04-12 DIAGNOSIS — K64.8 OTHER HEMORRHOIDS: ICD-10-CM

## 2021-04-12 PROCEDURE — 88305 TISSUE EXAM BY PATHOLOGIST: CPT

## 2021-04-12 PROCEDURE — 88305 TISSUE EXAM BY PATHOLOGIST: CPT | Mod: 26

## 2021-04-12 PROCEDURE — 88313 SPECIAL STAINS GROUP 2: CPT | Mod: 26

## 2021-04-12 PROCEDURE — 88313 SPECIAL STAINS GROUP 2: CPT

## 2021-04-12 NOTE — ASU PATIENT PROFILE, ADULT - PMH
Anxiety    Basal cell carcinoma (BCC), unspecified site    Diverticulitis    Essential hypertension    History of carotid stenosis    Hyperlipidemia    Prostate cancer

## 2021-07-08 ENCOUNTER — APPOINTMENT (OUTPATIENT)
Dept: CARDIOLOGY | Facility: CLINIC | Age: 81
End: 2021-07-08
Payer: MEDICARE

## 2021-07-08 ENCOUNTER — NON-APPOINTMENT (OUTPATIENT)
Age: 81
End: 2021-07-08

## 2021-07-08 VITALS
SYSTOLIC BLOOD PRESSURE: 114 MMHG | DIASTOLIC BLOOD PRESSURE: 60 MMHG | OXYGEN SATURATION: 98 % | BODY MASS INDEX: 27.16 KG/M2 | HEIGHT: 66 IN | WEIGHT: 169 LBS | HEART RATE: 57 BPM

## 2021-07-08 LAB
BASOPHILS # BLD AUTO: 0.04 K/UL
BASOPHILS NFR BLD AUTO: 0.6 %
EOSINOPHIL # BLD AUTO: 0.15 K/UL
EOSINOPHIL NFR BLD AUTO: 2.3 %
HCT VFR BLD CALC: 46.9 %
HGB BLD-MCNC: 15.6 G/DL
IMM GRANULOCYTES NFR BLD AUTO: 0.2 %
LYMPHOCYTES # BLD AUTO: 2.43 K/UL
LYMPHOCYTES NFR BLD AUTO: 37.3 %
MAN DIFF?: NORMAL
MCHC RBC-ENTMCNC: 31.8 PG
MCHC RBC-ENTMCNC: 33.3 GM/DL
MCV RBC AUTO: 95.7 FL
MONOCYTES # BLD AUTO: 0.7 K/UL
MONOCYTES NFR BLD AUTO: 10.8 %
NEUTROPHILS # BLD AUTO: 3.18 K/UL
NEUTROPHILS NFR BLD AUTO: 48.8 %
PLATELET # BLD AUTO: 228 K/UL
RBC # BLD: 4.9 M/UL
RBC # FLD: 13.1 %
WBC # FLD AUTO: 6.51 K/UL

## 2021-07-08 PROCEDURE — 99214 OFFICE O/P EST MOD 30 MIN: CPT

## 2021-07-08 PROCEDURE — 93000 ELECTROCARDIOGRAM COMPLETE: CPT

## 2021-07-08 RX ORDER — MULTIVIT-MIN/FOLIC/VIT K/LYCOP 400-300MCG
1000 TABLET ORAL DAILY
Refills: 0 | Status: DISCONTINUED | COMMUNITY
End: 2021-07-08

## 2021-07-08 NOTE — HISTORY OF PRESENT ILLNESS
[FreeTextEntry1] : 81 Y/O gentle,man PMH: HTN, HLD, Carotid stenosis S/P Right carotid artery stent Dr Mccartney [facility], anxiety, prostate cancer followed with Urology here in routine cardiac F/U \par \par Testing:\par Nuclear Stress Pharmacologic Multiple 02.02.21 Normal SPECT Myocardial Perfusion Imaging. No scan evidence of reversible or fixed perfusion defects, Normal left ventricular contractility with an ejection fraction of 71% (Normal:\par 50% or greater), No regional wall motion abnormalities.\par \par Echo 3/31/21 NL LV Sys FX, Mild MR \par

## 2021-07-08 NOTE — PHYSICAL EXAM
[Normal S1, S2] : normal S1, S2 [Soft] : abdomen soft [Non Tender] : non-tender [Normal Gait] : normal gait [No Edema] : no edema [No Rash] : no rash [Normal] : moves all extremities, no focal deficits, normal speech [Alert and Oriented] : alert and oriented

## 2021-07-08 NOTE — DISCUSSION/SUMMARY
[FreeTextEntry1] : Carotid disease followed Q 6 months with Dr Mccartney [facility] ( reports Carotid study completed March 2021 ) \par \par HTN: Controlled\par \par HLD: Continue statin with LDL goal 70\par \par Prostate cancer: Medical management with Urologist \par \par OV 4 months

## 2021-07-12 LAB
25(OH)D3 SERPL-MCNC: 60.7 NG/ML
ALBUMIN SERPL ELPH-MCNC: 4.4 G/DL
ALP BLD-CCNC: 71 U/L
ALT SERPL-CCNC: 34 U/L
ANION GAP SERPL CALC-SCNC: 11 MMOL/L
AST SERPL-CCNC: 30 U/L
BILIRUB SERPL-MCNC: 0.8 MG/DL
BUN SERPL-MCNC: 25 MG/DL
CALCIUM SERPL-MCNC: 9.4 MG/DL
CHLORIDE SERPL-SCNC: 96 MMOL/L
CHOLEST SERPL-MCNC: 152 MG/DL
CO2 SERPL-SCNC: 28 MMOL/L
CREAT SERPL-MCNC: 1.16 MG/DL
ESTIMATED AVERAGE GLUCOSE: 120 MG/DL
GLUCOSE SERPL-MCNC: 96 MG/DL
HBA1C MFR BLD HPLC: 5.8 %
HDLC SERPL-MCNC: 58 MG/DL
LDLC SERPL CALC-MCNC: 71 MG/DL
NONHDLC SERPL-MCNC: 94 MG/DL
POTASSIUM SERPL-SCNC: 4.5 MMOL/L
PROT SERPL-MCNC: 6.4 G/DL
SODIUM SERPL-SCNC: 135 MMOL/L
T3 SERPL-MCNC: 97 NG/DL
T4 SERPL-MCNC: 4.9 UG/DL
TRIGL SERPL-MCNC: 115 MG/DL
TSH SERPL-ACNC: 1.48 UIU/ML

## 2021-07-20 ENCOUNTER — NON-APPOINTMENT (OUTPATIENT)
Age: 81
End: 2021-07-20

## 2021-07-21 ENCOUNTER — NON-APPOINTMENT (OUTPATIENT)
Age: 81
End: 2021-07-21

## 2021-09-10 ENCOUNTER — OUTPATIENT (OUTPATIENT)
Dept: OUTPATIENT SERVICES | Facility: HOSPITAL | Age: 81
LOS: 1 days | End: 2021-09-10
Payer: MEDICARE

## 2021-09-10 ENCOUNTER — APPOINTMENT (OUTPATIENT)
Dept: RADIOLOGY | Facility: CLINIC | Age: 81
End: 2021-09-10
Payer: MEDICARE

## 2021-09-10 DIAGNOSIS — Z98.890 OTHER SPECIFIED POSTPROCEDURAL STATES: Chronic | ICD-10-CM

## 2021-09-10 DIAGNOSIS — I77.4 CELIAC ARTERY COMPRESSION SYNDROME: Chronic | ICD-10-CM

## 2021-09-10 DIAGNOSIS — Z90.79 ACQUIRED ABSENCE OF OTHER GENITAL ORGAN(S): Chronic | ICD-10-CM

## 2021-09-10 DIAGNOSIS — R05 COUGH: ICD-10-CM

## 2021-09-10 PROCEDURE — 71046 X-RAY EXAM CHEST 2 VIEWS: CPT

## 2021-09-10 PROCEDURE — 71046 X-RAY EXAM CHEST 2 VIEWS: CPT | Mod: 26

## 2021-09-10 RX ORDER — AZITHROMYCIN 500 MG/1
0 TABLET, FILM COATED ORAL
Qty: 0 | Refills: 0 | DISCHARGE
Start: 2021-09-10 | End: 2021-09-14

## 2021-09-11 ENCOUNTER — INPATIENT (INPATIENT)
Facility: HOSPITAL | Age: 81
LOS: 1 days | Discharge: HOME | DRG: 871 | End: 2021-09-13
Attending: HOSPITALIST | Admitting: FAMILY MEDICINE
Payer: MEDICARE

## 2021-09-11 VITALS
WEIGHT: 164.91 LBS | DIASTOLIC BLOOD PRESSURE: 62 MMHG | SYSTOLIC BLOOD PRESSURE: 107 MMHG | HEART RATE: 105 BPM | TEMPERATURE: 98 F | HEIGHT: 66 IN | RESPIRATION RATE: 20 BRPM | OXYGEN SATURATION: 97 %

## 2021-09-11 DIAGNOSIS — Z98.890 OTHER SPECIFIED POSTPROCEDURAL STATES: Chronic | ICD-10-CM

## 2021-09-11 DIAGNOSIS — I77.4 CELIAC ARTERY COMPRESSION SYNDROME: Chronic | ICD-10-CM

## 2021-09-11 DIAGNOSIS — J18.9 PNEUMONIA, UNSPECIFIED ORGANISM: ICD-10-CM

## 2021-09-11 DIAGNOSIS — Z90.79 ACQUIRED ABSENCE OF OTHER GENITAL ORGAN(S): Chronic | ICD-10-CM

## 2021-09-11 LAB
ADD ON TEST-SPECIMEN IN LAB: SIGNIFICANT CHANGE UP
ALBUMIN SERPL ELPH-MCNC: 3.4 G/DL — SIGNIFICANT CHANGE UP (ref 3.3–5)
ALP SERPL-CCNC: 65 U/L — SIGNIFICANT CHANGE UP (ref 40–120)
ALT FLD-CCNC: 38 U/L — SIGNIFICANT CHANGE UP (ref 12–78)
ANION GAP SERPL CALC-SCNC: 6 MMOL/L — SIGNIFICANT CHANGE UP (ref 5–17)
APPEARANCE UR: CLEAR — SIGNIFICANT CHANGE UP
APTT BLD: 28.4 SEC — SIGNIFICANT CHANGE UP (ref 27.5–35.5)
AST SERPL-CCNC: 29 U/L — SIGNIFICANT CHANGE UP (ref 15–37)
BACTERIA # UR AUTO: ABNORMAL
BASOPHILS # BLD AUTO: 0.03 K/UL — SIGNIFICANT CHANGE UP (ref 0–0.2)
BASOPHILS NFR BLD AUTO: 0.2 % — SIGNIFICANT CHANGE UP (ref 0–2)
BILIRUB SERPL-MCNC: 1.2 MG/DL — SIGNIFICANT CHANGE UP (ref 0.2–1.2)
BILIRUB UR-MCNC: NEGATIVE — SIGNIFICANT CHANGE UP
BUN SERPL-MCNC: 20 MG/DL — SIGNIFICANT CHANGE UP (ref 7–23)
CALCIUM SERPL-MCNC: 9 MG/DL — SIGNIFICANT CHANGE UP (ref 8.5–10.1)
CHLORIDE SERPL-SCNC: 90 MMOL/L — LOW (ref 96–108)
CO2 SERPL-SCNC: 30 MMOL/L — SIGNIFICANT CHANGE UP (ref 22–31)
COLOR SPEC: YELLOW — SIGNIFICANT CHANGE UP
CREAT SERPL-MCNC: 1.49 MG/DL — HIGH (ref 0.5–1.3)
DIFF PNL FLD: ABNORMAL
EOSINOPHIL # BLD AUTO: 0.02 K/UL — SIGNIFICANT CHANGE UP (ref 0–0.5)
EOSINOPHIL NFR BLD AUTO: 0.2 % — SIGNIFICANT CHANGE UP (ref 0–6)
EPI CELLS # UR: SIGNIFICANT CHANGE UP
GLUCOSE SERPL-MCNC: 181 MG/DL — HIGH (ref 70–99)
GLUCOSE UR QL: NEGATIVE MG/DL — SIGNIFICANT CHANGE UP
HCT VFR BLD CALC: 45.7 % — SIGNIFICANT CHANGE UP (ref 39–50)
HGB BLD-MCNC: 15.7 G/DL — SIGNIFICANT CHANGE UP (ref 13–17)
HYALINE CASTS # UR AUTO: ABNORMAL /LPF
IMM GRANULOCYTES NFR BLD AUTO: 0.8 % — SIGNIFICANT CHANGE UP (ref 0–1.5)
INR BLD: 1.13 RATIO — SIGNIFICANT CHANGE UP (ref 0.88–1.16)
KETONES UR-MCNC: NEGATIVE — SIGNIFICANT CHANGE UP
LEGIONELLA AG UR QL: NEGATIVE — SIGNIFICANT CHANGE UP
LEUKOCYTE ESTERASE UR-ACNC: NEGATIVE — SIGNIFICANT CHANGE UP
LYMPHOCYTES # BLD AUTO: 0.99 K/UL — LOW (ref 1–3.3)
LYMPHOCYTES # BLD AUTO: 7.4 % — LOW (ref 13–44)
MAGNESIUM SERPL-MCNC: 2.3 MG/DL — SIGNIFICANT CHANGE UP (ref 1.6–2.6)
MCHC RBC-ENTMCNC: 31.4 PG — SIGNIFICANT CHANGE UP (ref 27–34)
MCHC RBC-ENTMCNC: 34.4 GM/DL — SIGNIFICANT CHANGE UP (ref 32–36)
MCV RBC AUTO: 91.4 FL — SIGNIFICANT CHANGE UP (ref 80–100)
MONOCYTES # BLD AUTO: 0.94 K/UL — HIGH (ref 0–0.9)
MONOCYTES NFR BLD AUTO: 7.1 % — SIGNIFICANT CHANGE UP (ref 2–14)
NEUTROPHILS # BLD AUTO: 11.23 K/UL — HIGH (ref 1.8–7.4)
NEUTROPHILS NFR BLD AUTO: 84.3 % — HIGH (ref 43–77)
NITRITE UR-MCNC: NEGATIVE — SIGNIFICANT CHANGE UP
PH UR: 7 — SIGNIFICANT CHANGE UP (ref 5–8)
PLATELET # BLD AUTO: 201 K/UL — SIGNIFICANT CHANGE UP (ref 150–400)
POTASSIUM SERPL-MCNC: 3.3 MMOL/L — LOW (ref 3.5–5.3)
POTASSIUM SERPL-SCNC: 3.3 MMOL/L — LOW (ref 3.5–5.3)
PROT SERPL-MCNC: 7.2 GM/DL — SIGNIFICANT CHANGE UP (ref 6–8.3)
PROT UR-MCNC: 15 MG/DL
PROTHROM AB SERPL-ACNC: 13 SEC — SIGNIFICANT CHANGE UP (ref 10.6–13.6)
RBC # BLD: 5 M/UL — SIGNIFICANT CHANGE UP (ref 4.2–5.8)
RBC # FLD: 12.9 % — SIGNIFICANT CHANGE UP (ref 10.3–14.5)
RBC CASTS # UR COMP ASSIST: ABNORMAL /HPF (ref 0–4)
SARS-COV-2 RNA SPEC QL NAA+PROBE: SIGNIFICANT CHANGE UP
SODIUM SERPL-SCNC: 126 MMOL/L — LOW (ref 135–145)
SP GR SPEC: 1 — LOW (ref 1.01–1.02)
TROPONIN I SERPL-MCNC: <0.015 NG/ML — SIGNIFICANT CHANGE UP (ref 0.01–0.04)
UROBILINOGEN FLD QL: NEGATIVE MG/DL — SIGNIFICANT CHANGE UP
WBC # BLD: 13.31 K/UL — HIGH (ref 3.8–10.5)
WBC # FLD AUTO: 13.31 K/UL — HIGH (ref 3.8–10.5)
WBC UR QL: SIGNIFICANT CHANGE UP

## 2021-09-11 PROCEDURE — 71275 CT ANGIOGRAPHY CHEST: CPT | Mod: 26,MA

## 2021-09-11 PROCEDURE — 80048 BASIC METABOLIC PNL TOTAL CA: CPT

## 2021-09-11 PROCEDURE — 93010 ELECTROCARDIOGRAM REPORT: CPT

## 2021-09-11 PROCEDURE — 71045 X-RAY EXAM CHEST 1 VIEW: CPT | Mod: 26

## 2021-09-11 PROCEDURE — 93306 TTE W/DOPPLER COMPLETE: CPT

## 2021-09-11 PROCEDURE — 99223 1ST HOSP IP/OBS HIGH 75: CPT | Mod: AI

## 2021-09-11 PROCEDURE — 87449 NOS EACH ORGANISM AG IA: CPT

## 2021-09-11 PROCEDURE — 80053 COMPREHEN METABOLIC PANEL: CPT

## 2021-09-11 PROCEDURE — 86769 SARS-COV-2 COVID-19 ANTIBODY: CPT

## 2021-09-11 PROCEDURE — 81001 URINALYSIS AUTO W/SCOPE: CPT

## 2021-09-11 PROCEDURE — 99497 ADVNCD CARE PLAN 30 MIN: CPT | Mod: 25

## 2021-09-11 PROCEDURE — 99285 EMERGENCY DEPT VISIT HI MDM: CPT

## 2021-09-11 PROCEDURE — 36415 COLL VENOUS BLD VENIPUNCTURE: CPT

## 2021-09-11 PROCEDURE — 85025 COMPLETE CBC W/AUTO DIFF WBC: CPT

## 2021-09-11 RX ORDER — CHOLECALCIFEROL (VITAMIN D3) 125 MCG
1000 CAPSULE ORAL DAILY
Refills: 0 | Status: DISCONTINUED | OUTPATIENT
Start: 2021-09-11 | End: 2021-09-13

## 2021-09-11 RX ORDER — AMITRIPTYLINE HCL 25 MG
30 TABLET ORAL AT BEDTIME
Refills: 0 | Status: DISCONTINUED | OUTPATIENT
Start: 2021-09-11 | End: 2021-09-13

## 2021-09-11 RX ORDER — ACETAMINOPHEN 500 MG
650 TABLET ORAL EVERY 6 HOURS
Refills: 0 | Status: DISCONTINUED | OUTPATIENT
Start: 2021-09-11 | End: 2021-09-13

## 2021-09-11 RX ORDER — GUAIFENESIN/DEXTROMETHORPHAN 600MG-30MG
10 TABLET, EXTENDED RELEASE 12 HR ORAL EVERY 6 HOURS
Refills: 0 | Status: DISCONTINUED | OUTPATIENT
Start: 2021-09-11 | End: 2021-09-12

## 2021-09-11 RX ORDER — CEFTRIAXONE 500 MG/1
1000 INJECTION, POWDER, FOR SOLUTION INTRAMUSCULAR; INTRAVENOUS ONCE
Refills: 0 | Status: COMPLETED | OUTPATIENT
Start: 2021-09-11 | End: 2021-09-11

## 2021-09-11 RX ORDER — POTASSIUM CHLORIDE 20 MEQ
40 PACKET (EA) ORAL ONCE
Refills: 0 | Status: COMPLETED | OUTPATIENT
Start: 2021-09-11 | End: 2021-09-11

## 2021-09-11 RX ORDER — CEFTRIAXONE 500 MG/1
1000 INJECTION, POWDER, FOR SOLUTION INTRAMUSCULAR; INTRAVENOUS ONCE
Refills: 0 | Status: DISCONTINUED | OUTPATIENT
Start: 2021-09-11 | End: 2021-09-11

## 2021-09-11 RX ORDER — METOPROLOL TARTRATE 50 MG
25 TABLET ORAL EVERY 12 HOURS
Refills: 0 | Status: DISCONTINUED | OUTPATIENT
Start: 2021-09-11 | End: 2021-09-13

## 2021-09-11 RX ORDER — SODIUM CHLORIDE 9 MG/ML
1000 INJECTION INTRAMUSCULAR; INTRAVENOUS; SUBCUTANEOUS ONCE
Refills: 0 | Status: COMPLETED | OUTPATIENT
Start: 2021-09-11 | End: 2021-09-11

## 2021-09-11 RX ORDER — LANOLIN ALCOHOL/MO/W.PET/CERES
5 CREAM (GRAM) TOPICAL AT BEDTIME
Refills: 0 | Status: DISCONTINUED | OUTPATIENT
Start: 2021-09-11 | End: 2021-09-13

## 2021-09-11 RX ORDER — ATORVASTATIN CALCIUM 80 MG/1
10 TABLET, FILM COATED ORAL AT BEDTIME
Refills: 0 | Status: DISCONTINUED | OUTPATIENT
Start: 2021-09-11 | End: 2021-09-13

## 2021-09-11 RX ORDER — METOCLOPRAMIDE HCL 10 MG
10 TABLET ORAL EVERY 6 HOURS
Refills: 0 | Status: DISCONTINUED | OUTPATIENT
Start: 2021-09-11 | End: 2021-09-13

## 2021-09-11 RX ORDER — CEFTRIAXONE 500 MG/1
1000 INJECTION, POWDER, FOR SOLUTION INTRAMUSCULAR; INTRAVENOUS EVERY 24 HOURS
Refills: 0 | Status: DISCONTINUED | OUTPATIENT
Start: 2021-09-11 | End: 2021-09-13

## 2021-09-11 RX ORDER — AZITHROMYCIN 500 MG/1
500 TABLET, FILM COATED ORAL ONCE
Refills: 0 | Status: COMPLETED | OUTPATIENT
Start: 2021-09-11 | End: 2021-09-11

## 2021-09-11 RX ORDER — DEXTROSE MONOHYDRATE, SODIUM CHLORIDE, AND POTASSIUM CHLORIDE 50; .745; 4.5 G/1000ML; G/1000ML; G/1000ML
1000 INJECTION, SOLUTION INTRAVENOUS
Refills: 0 | Status: DISCONTINUED | OUTPATIENT
Start: 2021-09-11 | End: 2021-09-12

## 2021-09-11 RX ORDER — PANTOPRAZOLE SODIUM 20 MG/1
40 TABLET, DELAYED RELEASE ORAL
Refills: 0 | Status: DISCONTINUED | OUTPATIENT
Start: 2021-09-11 | End: 2021-09-13

## 2021-09-11 RX ORDER — SENNA PLUS 8.6 MG/1
2 TABLET ORAL AT BEDTIME
Refills: 0 | Status: DISCONTINUED | OUTPATIENT
Start: 2021-09-11 | End: 2021-09-13

## 2021-09-11 RX ORDER — ENOXAPARIN SODIUM 100 MG/ML
40 INJECTION SUBCUTANEOUS DAILY
Refills: 0 | Status: DISCONTINUED | OUTPATIENT
Start: 2021-09-11 | End: 2021-09-13

## 2021-09-11 RX ORDER — CLOPIDOGREL BISULFATE 75 MG/1
75 TABLET, FILM COATED ORAL DAILY
Refills: 0 | Status: DISCONTINUED | OUTPATIENT
Start: 2021-09-11 | End: 2021-09-13

## 2021-09-11 RX ORDER — ONDANSETRON 8 MG/1
4 TABLET, FILM COATED ORAL EVERY 6 HOURS
Refills: 0 | Status: DISCONTINUED | OUTPATIENT
Start: 2021-09-11 | End: 2021-09-13

## 2021-09-11 RX ORDER — CALCIUM CARBONATE 500(1250)
3 TABLET ORAL EVERY 6 HOURS
Refills: 0 | Status: DISCONTINUED | OUTPATIENT
Start: 2021-09-11 | End: 2021-09-13

## 2021-09-11 RX ADMIN — DEXTROSE MONOHYDRATE, SODIUM CHLORIDE, AND POTASSIUM CHLORIDE 75 MILLILITER(S): 50; .745; 4.5 INJECTION, SOLUTION INTRAVENOUS at 18:53

## 2021-09-11 RX ADMIN — Medication 40 MILLIEQUIVALENT(S): at 18:53

## 2021-09-11 RX ADMIN — Medication 30 MILLIGRAM(S): at 22:27

## 2021-09-11 RX ADMIN — Medication 10 MILLILITER(S): at 22:29

## 2021-09-11 RX ADMIN — Medication 5 MILLIGRAM(S): at 23:46

## 2021-09-11 RX ADMIN — CEFTRIAXONE 1000 MILLIGRAM(S): 500 INJECTION, POWDER, FOR SOLUTION INTRAMUSCULAR; INTRAVENOUS at 13:46

## 2021-09-11 RX ADMIN — Medication 25 MILLIGRAM(S): at 22:28

## 2021-09-11 RX ADMIN — ATORVASTATIN CALCIUM 10 MILLIGRAM(S): 80 TABLET, FILM COATED ORAL at 22:28

## 2021-09-11 RX ADMIN — SODIUM CHLORIDE 1000 MILLILITER(S): 9 INJECTION INTRAMUSCULAR; INTRAVENOUS; SUBCUTANEOUS at 13:05

## 2021-09-11 RX ADMIN — ENOXAPARIN SODIUM 40 MILLIGRAM(S): 100 INJECTION SUBCUTANEOUS at 18:53

## 2021-09-11 RX ADMIN — AZITHROMYCIN 255 MILLIGRAM(S): 500 TABLET, FILM COATED ORAL at 13:46

## 2021-09-11 NOTE — ED STATDOCS - PROGRESS NOTE DETAILS
Yen YEH for Dr. Aguilar   79 y/o male with PMHx of Afib, carotid stenosis, prostate cancer, anxiety, HTN, and HLD presents to the ED c/o cough and SOB. Pt states he had blood work done yesterday after having chest pain. Pt states he developed new onset cough and SOB this morning, was concerned so he came to the ED for evaluation.   Will send pt to main ED for further evaluation.

## 2021-09-11 NOTE — H&P ADULT - NSICDXPASTMEDICALHX_GEN_ALL_CORE_FT
PAST MEDICAL HISTORY:  Afib     Anxiety     Basal cell carcinoma (BCC), unspecified site     Diverticulitis     Essential hypertension     History of carotid stenosis     Hyperlipidemia     Prostate cancer

## 2021-09-11 NOTE — ED ADULT TRIAGE NOTE - CHIEF COMPLAINT QUOTE
Pt presents to er with complaints of cough and SOB since this morning, pt states new onset of symptoms this morning.

## 2021-09-11 NOTE — ED PROVIDER NOTE - OBJECTIVE STATEMENT
81 y/o male with a PMHx of  anxiety, prostate CA, carotid stenosis s/p carotid endarterectomy , HLD, Basal cell CA, Diverticulitis, HTN, transurethral resection of prostate, inguinal hernia repair, hemorrhoidectomy, , celiac artery stenosis presents to the ED c/o SOB.  +malaise x1 week. +general weakness. +CP +right shoulder pain. +cough with yellow sputum.  Using cough syrup without relief . Saw OP MD and felt it was b/l PNA and got rx abx. When pt went to  meds, he felt weak. Hx of having COVID in February and had J&J vaccine in May.

## 2021-09-11 NOTE — ED STATDOCS - NSICDXFAMILYHX_GEN_ALL_CORE_FT
FAMILY HISTORY:  Family history of CHF (congestive heart failure), father  Family history of colon cancer in father

## 2021-09-11 NOTE — ED STATDOCS - NSICDXPASTMEDICALHX_GEN_ALL_CORE_FT
PAST MEDICAL HISTORY:  Anxiety     Basal cell carcinoma (BCC), unspecified site     Diverticulitis     Essential hypertension     History of carotid stenosis     Hyperlipidemia     Prostate cancer       Afib

## 2021-09-11 NOTE — H&P ADULT - NSHPREVIEWOFSYSTEMS_GEN_ALL_CORE
PHYSICAL EXAM:    T(C): 36.7 (09-11-21 @ 14:40), Max: 36.7 (09-11-21 @ 10:40)  HR: 98 (09-11-21 @ 14:40) (98 - 105)  BP: 112/64 (09-11-21 @ 14:40) (107/62 - 112/64)  RR: 17 (09-11-21 @ 14:40) (17 - 20)  SpO2: 98% (09-11-21 @ 14:40) (97% - 98%)    General: AAOx3; NAD  Head: AT/NC  ENT: Moist Mucous Membranes; No Injury  Neck: Non-tender; No JVD  CVS: RRR, S1&S2, No murmur, No edema  Respiratory: Lungs CTA B/L; Normal Respiratory Effort  Abdomen/GI: Soft, non-tender, non-distended, no guarding, no rebound, normal bowel sounds  : No bladder distention, No Mchugh  Extremites: No cyanosis, No clubbing, No edema  MSK: No CVA tenderness, Normal ROM, No injury  Neuro: AAOx3, CNII-XII grossly intact, non-focal  Psych: Appropriate, Cooperative, No depression, No anxiety  Skin: Clean, Dry and Intact PHYSICAL EXAM:    T(C): 36.7 (09-11-21 @ 14:40), Max: 36.7 (09-11-21 @ 10:40)  HR: 98 (09-11-21 @ 14:40) (98 - 105)  BP: 112/64 (09-11-21 @ 14:40) (107/62 - 112/64)  RR: 17 (09-11-21 @ 14:40) (17 - 20)  SpO2: 98% (09-11-21 @ 14:40) (97% - 98%)    General: AAOx3; NAD  Head: AT/NC  ENT: Moist Mucous Membranes; No Injury  Neck: Non-tender; No JVD  CVS: RRR, S1&S2, No murmur, No edema  Respiratory: Decreased breath sounds bibasilr. ; Normal Respiratory Effort  Abdomen/GI: Soft, non-tender, non-distended, no guarding, no rebound, normal bowel sounds  : No bladder distention, No Mchugh  Extremites: No cyanosis, No clubbing, No edema  MSK: No CVA tenderness, Normal ROM, No injury  Neuro: AAOx3, CNII-XII grossly intact, non-focal  Psych: Appropriate, Cooperative,  Skin: Clean, Dry and Intact Review of Systems: 14 Point review of systems reviewed and reported as negative unless otherwise stated in HPI

## 2021-09-11 NOTE — ED PROVIDER NOTE - NS ED ROS FT
Constitutional: No fever. +malaise.  Neurological: No headache.  Eyes: No vision changes.   Ears, Nose, Mouth, Throat: No congestion.  Cardiovascular: + chest pain.  Respiratory: +SOB. +cough  Gastrointestinal: No nausea or vomiting.  Genitourinary: No dysuria.  Musculoskeletal: +right shoulder pain.  Integumentary (skin and/or breast): No rash.

## 2021-09-11 NOTE — ED PROVIDER NOTE - PHYSICAL EXAMINATION
Vital signs as available reviewed.  General:  Comfortable, no acute distress.  Head:  Normocephalic, atraumatic.  Eyes:  Conjunctiva pink, no icterus.  Cardiovascular: Irregularly irregular rate, mild tachycardiac, no obvious murmur.  Respiratory:  Right rales/wheezing in lower lobe. Frequent cough.  Abdomen:  Soft, non-tender.  Musculoskeletal:  No deformity or calf tenderness.  Neurologic: Alert and oriented, moving all extremities.  Skin:  Warm and dry.

## 2021-09-11 NOTE — H&P ADULT - NSHPLABSRESULTS_GEN_ALL_CORE
MEDICATIONS  (STANDING):  amitriptyline Oral Tab/Cap - Peds 30 milliGRAM(s) Oral at bedtime  atorvastatin 10 milliGRAM(s) Oral at bedtime  cefTRIAXone Injectable. 1000 milliGRAM(s) IV Push every 24 hours  cholecalciferol 1000 Unit(s) Oral daily  clopidogrel Tablet 75 milliGRAM(s) Oral daily  enoxaparin Injectable 40 milliGRAM(s) SubCutaneous daily  metoprolol succinate ER 25 milliGRAM(s) Oral every 12 hours  multivitamin 1 Tablet(s) Oral daily  pantoprazole    Tablet 40 milliGRAM(s) Oral before breakfast    MEDICATIONS  (PRN):  acetaminophen   Tablet .. 650 milliGRAM(s) Oral every 6 hours PRN Mild Pain (1 - 3)  aluminum hydroxide/magnesium hydroxide/simethicone Suspension 30 milliLiter(s) Oral every 4 hours PRN Dyspepsia  calcium carbonate    500 mG (Tums) Chewable 3 Tablet(s) Chew every 6 hours PRN Dyspepsia  metoclopramide Injectable 10 milliGRAM(s) IV Push every 6 hours PRN Nausea and/or Vomiting  ondansetron Injectable 4 milliGRAM(s) IV Push every 6 hours PRN Nausea  senna 2 Tablet(s) Oral at bedtime PRN Constipation MEDICATIONS  (STANDING):  amitriptyline Oral Tab/Cap - Peds 30 milliGRAM(s) Oral at bedtime  atorvastatin 10 milliGRAM(s) Oral at bedtime  cefTRIAXone Injectable. 1000 milliGRAM(s) IV Push every 24 hours  cholecalciferol 1000 Unit(s) Oral daily  clopidogrel Tablet 75 milliGRAM(s) Oral daily  enoxaparin Injectable 40 milliGRAM(s) SubCutaneous daily  metoprolol succinate ER 25 milliGRAM(s) Oral every 12 hours  multivitamin 1 Tablet(s) Oral daily  pantoprazole    Tablet 40 milliGRAM(s) Oral before breakfast    MEDICATIONS  (PRN):  acetaminophen   Tablet .. 650 milliGRAM(s) Oral every 6 hours PRN Mild Pain (1 - 3)  aluminum hydroxide/magnesium hydroxide/simethicone Suspension 30 milliLiter(s) Oral every 4 hours PRN Dyspepsia  calcium carbonate    500 mG (Tums) Chewable 3 Tablet(s) Chew every 6 hours PRN Dyspepsia  metoclopramide Injectable 10 milliGRAM(s) IV Push every 6 hours PRN Nausea and/or Vomiting  ondansetron Injectable 4 milliGRAM(s) IV Push every 6 hours PRN Nausea  senna 2 Tablet(s) Oral at bedtime PRN Constipation      #Severe Sepsis  #Bilateral Pneumonia  #Acute Kidney Injury  -Associated hyponatremia  -Admit to medicine  -IV ABX  -Cultures ordered and pending  -Albuterol MDI  -IV as needed for fluid balance MEDICATIONS  (STANDING):  amitriptyline Oral Tab/Cap - Peds 30 milliGRAM(s) Oral at bedtime  atorvastatin 10 milliGRAM(s) Oral at bedtime  cefTRIAXone Injectable. 1000 milliGRAM(s) IV Push every 24 hours  cholecalciferol 1000 Unit(s) Oral daily  clopidogrel Tablet 75 milliGRAM(s) Oral daily  enoxaparin Injectable 40 milliGRAM(s) SubCutaneous daily  metoprolol succinate ER 25 milliGRAM(s) Oral every 12 hours  multivitamin 1 Tablet(s) Oral daily  pantoprazole    Tablet 40 milliGRAM(s) Oral before breakfast    MEDICATIONS  (PRN):  acetaminophen   Tablet .. 650 milliGRAM(s) Oral every 6 hours PRN Mild Pain (1 - 3)  aluminum hydroxide/magnesium hydroxide/simethicone Suspension 30 milliLiter(s) Oral every 4 hours PRN Dyspepsia  calcium carbonate    500 mG (Tums) Chewable 3 Tablet(s) Chew every 6 hours PRN Dyspepsia  metoclopramide Injectable 10 milliGRAM(s) IV Push every 6 hours PRN Nausea and/or Vomiting  ondansetron Injectable 4 milliGRAM(s) IV Push every 6 hours PRN Nausea  senna 2 Tablet(s) Oral at bedtime PRN Constipation      #Severe Sepsis  #Bilateral Pneumonia  #Acute Kidney Injury  #Hypovolemia Hyponatramia  -Admit to medicine  -IV ABX  -Cultures ordered and pending  -Albuterol MDI  -IV as needed for fluid balance  -CTA findings reviewed ,.  -In the setting of reported weight gain despite decreased intake and increased PO intake, order TTE.       #HLD. Continue home meds. Of note zetia not on formulary.   #HTN. Home medications. ARB hold in the setting of JIMMY  #GERD. Continue PPI.       DVT Prophylaxis: Lovenox subq    Goals of Care (GOC)/Advance Care Planning (ACP)    Date of Discussion/evaluation: 9/11/2021    Purpose of Discussion: In the setting of advanced age and multiple comorbidities, discussion of patient's wished should there be any deterioration in health status.     Parties Present/Discussed with: Patient    Patient's Decision making capacity at the time of discussion: AAOx4 and has full decision making capacity    Presentation: As above    GOC: Code Status, HCP, Alternative Feeding Methods, Blood product Transfusions    PLAN:  Code Status: Full code  HCP: Wife   Alternative Feeding methods: Would like to discuss or assess should the situation arise that it requires alternative feeding methods  Blood Product Transfusions: YES    ACP/GOC Time Spent:16 minutes MEDICATIONS  (STANDING):  amitriptyline Oral Tab/Cap - Peds 30 milliGRAM(s) Oral at bedtime  atorvastatin 10 milliGRAM(s) Oral at bedtime  cefTRIAXone Injectable. 1000 milliGRAM(s) IV Push every 24 hours  cholecalciferol 1000 Unit(s) Oral daily  clopidogrel Tablet 75 milliGRAM(s) Oral daily  enoxaparin Injectable 40 milliGRAM(s) SubCutaneous daily  metoprolol succinate ER 25 milliGRAM(s) Oral every 12 hours  multivitamin 1 Tablet(s) Oral daily  pantoprazole    Tablet 40 milliGRAM(s) Oral before breakfast  sodium chloride 0.9% with potassium chloride 20 mEq/L 1000 milliLiter(s) (75 mL/Hr) IV Continuous <Continuous>    MEDICATIONS  (PRN):  acetaminophen   Tablet .. 650 milliGRAM(s) Oral every 6 hours PRN Mild Pain (1 - 3)  aluminum hydroxide/magnesium hydroxide/simethicone Suspension 30 milliLiter(s) Oral every 4 hours PRN Dyspepsia  calcium carbonate    500 mG (Tums) Chewable 3 Tablet(s) Chew every 6 hours PRN Dyspepsia  metoclopramide Injectable 10 milliGRAM(s) IV Push every 6 hours PRN Nausea and/or Vomiting  ondansetron Injectable 4 milliGRAM(s) IV Push every 6 hours PRN Nausea  senna 2 Tablet(s) Oral at bedtime PRN Constipation        #Severe Sepsis  #Bilateral Pneumonia  #Acute Kidney Injury  #Hypovolemia Hyponatramia  -Admit to medicine  -IV Rocephin/PO Azithromycin  -Cultures ordered  -Albuterol MDI  -IV as needed for fluid balance  -CTA findings reviewed ,.  -ON admission no O2 supplementation needed. Apply O2 if necessary to maintain SpO2>90%  -Symptomatic Rx   -In the setting of reported weight gain despite decreased intake and increased PO intake, order TTE.       #HLD. Continue home meds. Of note zetia not on formulary.   #HTN. Home medications. ARB hold in the setting of JIMMY  #GERD. Continue PPI.       DVT Prophylaxis: Lovenox subq    Goals of Care (GOC)/Advance Care Planning (ACP)    Date of Discussion/evaluation: 9/11/2021    Purpose of Discussion: In the setting of advanced age and multiple comorbidities, discussion of patient's wished should there be any deterioration in health status.     Parties Present/Discussed with: Patient    Patient's Decision making capacity at the time of discussion: AAOx4 and has full decision making capacity    Presentation: As above    GOC: Code Status, HCP, Alternative Feeding Methods, Blood product Transfusions    PLAN:  Code Status: Full code  HCP: Wife   Alternative Feeding methods: Would like to discuss or assess should the situation arise that it requires alternative feeding methods  Blood Product Transfusions: YES    ACP/GOC Time Spent:16 minutes

## 2021-09-11 NOTE — H&P ADULT - NSHPPHYSICALEXAM_GEN_ALL_CORE
PHYSICAL EXAM:    T(C): 36.7 (09-11-21 @ 14:40), Max: 36.7 (09-11-21 @ 10:40)  HR: 98 (09-11-21 @ 14:40) (98 - 105)  BP: 112/64 (09-11-21 @ 14:40) (107/62 - 112/64)  RR: 17 (09-11-21 @ 14:40) (17 - 20)  SpO2: 98% (09-11-21 @ 14:40) (97% - 98%)    General: AAOx3; NAD  Head: AT/NC  ENT: Moist Mucous Membranes; No Injury  Neck: Non-tender; No JVD  CVS: RRR, S1&S2, No murmur, No edema  Respiratory: Lungs CTA B/L; Normal Respiratory Effort  Abdomen/GI: Soft, non-tender, non-distended, no guarding, no rebound, normal bowel sounds  : No bladder distention, No Mchugh  Extremites: No cyanosis, No clubbing, No edema  MSK: No CVA tenderness, Normal ROM, No injury  Neuro: AAOx3, CNII-XII grossly intact, non-focal  Psych: Appropriate, Cooperative, No depression, No anxiety  Skin: Clean, Dry and Intact PHYSICAL EXAM:    T(C): 36.7 (09-11-21 @ 14:40), Max: 36.7 (09-11-21 @ 10:40)  HR: 98 (09-11-21 @ 14:40) (98 - 105)  BP: 112/64 (09-11-21 @ 14:40) (107/62 - 112/64)  RR: 17 (09-11-21 @ 14:40) (17 - 20)  SpO2: 98% (09-11-21 @ 14:40) (97% - 98%)    General: AAOx3; NAD  Head: AT/NC  ENT: Moist Mucous Membranes; No Injury  Neck: Non-tender; No JVD  CVS: RRR, S1&S2, No murmur, No edema  Respiratory: Decreased bibasilar breath sounds; Normal Respiratory Effort and saturation on RA  Abdomen/GI: Soft, non-tender, non-distended, no guarding, no rebound, normal bowel sounds  : No bladder distention, No Mchugh  Extremites: No cyanosis, No clubbing, No edema  MSK: No CVA tenderness, Normal ROM, No injury  Neuro: AAOx3, CNII-XII grossly intact, non-focal  Psych: Appropriate, Cooperative  Skin: Clean, Dry and Intact

## 2021-09-11 NOTE — H&P ADULT - HISTORY OF PRESENT ILLNESS
CC: Progressive worsening cough and SOB      FROM H&P:    "80M with PMH of Prostate Ca (Jace 6), HTN, HLD, Arrythmia NOS presents with progressive SOB and cough with exertion. CC: Progressive worsening cough and SOB      FROM H&P:    "80M with PMH of Prostate Ca (Jace 6), HTN, HLD, Arrythmia NOS presents with progressive SOB and cough with exertion over the last several days. No sick contacts. No COVID-19 contacts.  No recent tracel. COVID-19 vaccination with J&J 5/2021. Reports unquantifiable weight loss/ CC: Progressive worsening cough and SOB      FROM H&P:    "80M with PMH of Prostate Ca (Jace 6), HTN, HLD, Arrythmia NOS presents with progressive SOB and cough with exertion over the last several days. No sick contacts. No COVID-19 contacts.  No recent tracel. COVID-19 vaccination with J&J 5/2021. Reports unquantifiable weight gain despite decreased PO intake. Cough is productive with yellow sputum. Associated fatigue and lethargy Denies syncope, dizziness, chest pain, nausea, vomiting, abdominal pain/discomfort,  CC: Progressive worsening cough and SOB      FROM H&P:    "80M with PMH of Prostate Ca (Jace 6), HTN, HLD, Arrythmia NOS presents with progressive SOB and cough with exertion over the last several days. No sick contacts. No COVID-19 contacts.  No recent tracel. COVID-19 vaccination with J&J 5/2021. Reports unquantifiable weight gain despite decreased PO intake. Cough is productive with yellow sputum. Associated fatigue and lethargy Denies syncope, dizziness, chest pain, nausea, vomiting, abdominal pain/discomfort,     In the ED, Afebrile with Tachycardia (), WBC 13.31, Na 126 and Cr 1.49, NT Pro-. Troponin negative. CTA chest negative for PE but with multifocal pneumonia. COVID-19 PCR negative.

## 2021-09-11 NOTE — ED PROVIDER NOTE - CARE PLAN
Principal Discharge DX:	Multifocal pneumonia  Secondary Diagnosis:	Hypokalemia  Secondary Diagnosis:	Hyponatremia  Secondary Diagnosis:	JIMMY (acute kidney injury)   1

## 2021-09-11 NOTE — H&P ADULT - NSHPSOCIALHISTORY_GEN_ALL_CORE
Rare EtOH Use  Former Tobacco Use. Quit 1975. Former 2PPD smoker x 20 years.   Denies illicit drug us.

## 2021-09-12 LAB
ALBUMIN SERPL ELPH-MCNC: 2.9 G/DL — LOW (ref 3.3–5)
ALP SERPL-CCNC: 65 U/L — SIGNIFICANT CHANGE UP (ref 40–120)
ALT FLD-CCNC: 50 U/L — SIGNIFICANT CHANGE UP (ref 12–78)
ANION GAP SERPL CALC-SCNC: 5 MMOL/L — SIGNIFICANT CHANGE UP (ref 5–17)
AST SERPL-CCNC: 39 U/L — HIGH (ref 15–37)
BASOPHILS # BLD AUTO: 0.02 K/UL — SIGNIFICANT CHANGE UP (ref 0–0.2)
BASOPHILS NFR BLD AUTO: 0.2 % — SIGNIFICANT CHANGE UP (ref 0–2)
BILIRUB SERPL-MCNC: 0.7 MG/DL — SIGNIFICANT CHANGE UP (ref 0.2–1.2)
BUN SERPL-MCNC: 16 MG/DL — SIGNIFICANT CHANGE UP (ref 7–23)
CALCIUM SERPL-MCNC: 8.2 MG/DL — LOW (ref 8.5–10.1)
CHLORIDE SERPL-SCNC: 99 MMOL/L — SIGNIFICANT CHANGE UP (ref 96–108)
CO2 SERPL-SCNC: 28 MMOL/L — SIGNIFICANT CHANGE UP (ref 22–31)
COVID-19 SPIKE DOMAIN AB INTERP: POSITIVE
COVID-19 SPIKE DOMAIN ANTIBODY RESULT: >250 U/ML — HIGH
CREAT SERPL-MCNC: 0.99 MG/DL — SIGNIFICANT CHANGE UP (ref 0.5–1.3)
EOSINOPHIL # BLD AUTO: 0.05 K/UL — SIGNIFICANT CHANGE UP (ref 0–0.5)
EOSINOPHIL NFR BLD AUTO: 0.5 % — SIGNIFICANT CHANGE UP (ref 0–6)
GLUCOSE SERPL-MCNC: 123 MG/DL — HIGH (ref 70–99)
HCT VFR BLD CALC: 38.8 % — LOW (ref 39–50)
HGB BLD-MCNC: 13.8 G/DL — SIGNIFICANT CHANGE UP (ref 13–17)
IMM GRANULOCYTES NFR BLD AUTO: 0.3 % — SIGNIFICANT CHANGE UP (ref 0–1.5)
LYMPHOCYTES # BLD AUTO: 1.59 K/UL — SIGNIFICANT CHANGE UP (ref 1–3.3)
LYMPHOCYTES # BLD AUTO: 14.5 % — SIGNIFICANT CHANGE UP (ref 13–44)
MCHC RBC-ENTMCNC: 32.2 PG — SIGNIFICANT CHANGE UP (ref 27–34)
MCHC RBC-ENTMCNC: 35.6 GM/DL — SIGNIFICANT CHANGE UP (ref 32–36)
MCV RBC AUTO: 90.7 FL — SIGNIFICANT CHANGE UP (ref 80–100)
MONOCYTES # BLD AUTO: 1.02 K/UL — HIGH (ref 0–0.9)
MONOCYTES NFR BLD AUTO: 9.3 % — SIGNIFICANT CHANGE UP (ref 2–14)
NEUTROPHILS # BLD AUTO: 8.25 K/UL — HIGH (ref 1.8–7.4)
NEUTROPHILS NFR BLD AUTO: 75.2 % — SIGNIFICANT CHANGE UP (ref 43–77)
PLATELET # BLD AUTO: 196 K/UL — SIGNIFICANT CHANGE UP (ref 150–400)
POTASSIUM SERPL-MCNC: 3.8 MMOL/L — SIGNIFICANT CHANGE UP (ref 3.5–5.3)
POTASSIUM SERPL-SCNC: 3.8 MMOL/L — SIGNIFICANT CHANGE UP (ref 3.5–5.3)
PROT SERPL-MCNC: 6.5 GM/DL — SIGNIFICANT CHANGE UP (ref 6–8.3)
RBC # BLD: 4.28 M/UL — SIGNIFICANT CHANGE UP (ref 4.2–5.8)
RBC # FLD: 12.8 % — SIGNIFICANT CHANGE UP (ref 10.3–14.5)
SARS-COV-2 IGG+IGM SERPL QL IA: >250 U/ML — HIGH
SARS-COV-2 IGG+IGM SERPL QL IA: POSITIVE
SODIUM SERPL-SCNC: 132 MMOL/L — LOW (ref 135–145)
WBC # BLD: 10.96 K/UL — HIGH (ref 3.8–10.5)
WBC # FLD AUTO: 10.96 K/UL — HIGH (ref 3.8–10.5)

## 2021-09-12 PROCEDURE — 99232 SBSQ HOSP IP/OBS MODERATE 35: CPT

## 2021-09-12 RX ORDER — SODIUM CHLORIDE 9 MG/ML
1000 INJECTION INTRAMUSCULAR; INTRAVENOUS; SUBCUTANEOUS
Refills: 0 | Status: DISCONTINUED | OUTPATIENT
Start: 2021-09-12 | End: 2021-09-13

## 2021-09-12 RX ORDER — INFLUENZA VIRUS VACCINE 15; 15; 15; 15 UG/.5ML; UG/.5ML; UG/.5ML; UG/.5ML
0.5 SUSPENSION INTRAMUSCULAR ONCE
Refills: 0 | Status: COMPLETED | OUTPATIENT
Start: 2021-09-12 | End: 2021-09-12

## 2021-09-12 RX ORDER — AZITHROMYCIN 500 MG/1
500 TABLET, FILM COATED ORAL DAILY
Refills: 0 | Status: DISCONTINUED | OUTPATIENT
Start: 2021-09-12 | End: 2021-09-13

## 2021-09-12 RX ORDER — LIDOCAINE 4 G/100G
1 CREAM TOPICAL DAILY
Refills: 0 | Status: DISCONTINUED | OUTPATIENT
Start: 2021-09-12 | End: 2021-09-13

## 2021-09-12 RX ADMIN — CEFTRIAXONE 1000 MILLIGRAM(S): 500 INJECTION, POWDER, FOR SOLUTION INTRAMUSCULAR; INTRAVENOUS at 13:55

## 2021-09-12 RX ADMIN — Medication 40 MILLIGRAM(S): at 13:55

## 2021-09-12 RX ADMIN — DEXTROSE MONOHYDRATE, SODIUM CHLORIDE, AND POTASSIUM CHLORIDE 75 MILLILITER(S): 50; .745; 4.5 INJECTION, SOLUTION INTRAVENOUS at 10:56

## 2021-09-12 RX ADMIN — ATORVASTATIN CALCIUM 10 MILLIGRAM(S): 80 TABLET, FILM COATED ORAL at 22:01

## 2021-09-12 RX ADMIN — Medication 30 MILLIGRAM(S): at 22:01

## 2021-09-12 RX ADMIN — Medication 25 MILLIGRAM(S): at 22:01

## 2021-09-12 RX ADMIN — Medication 1000 UNIT(S): at 10:55

## 2021-09-12 RX ADMIN — ENOXAPARIN SODIUM 40 MILLIGRAM(S): 100 INJECTION SUBCUTANEOUS at 10:55

## 2021-09-12 RX ADMIN — AZITHROMYCIN 500 MILLIGRAM(S): 500 TABLET, FILM COATED ORAL at 13:55

## 2021-09-12 RX ADMIN — PANTOPRAZOLE SODIUM 40 MILLIGRAM(S): 20 TABLET, DELAYED RELEASE ORAL at 06:17

## 2021-09-12 RX ADMIN — Medication 10 MILLILITER(S): at 06:17

## 2021-09-12 RX ADMIN — Medication 1 TABLET(S): at 10:55

## 2021-09-12 RX ADMIN — CLOPIDOGREL BISULFATE 75 MILLIGRAM(S): 75 TABLET, FILM COATED ORAL at 10:55

## 2021-09-12 RX ADMIN — Medication 25 MILLIGRAM(S): at 10:55

## 2021-09-12 RX ADMIN — Medication 600 MILLIGRAM(S): at 22:01

## 2021-09-12 RX ADMIN — Medication 600 MILLIGRAM(S): at 13:55

## 2021-09-12 RX ADMIN — SODIUM CHLORIDE 75 MILLILITER(S): 9 INJECTION INTRAMUSCULAR; INTRAVENOUS; SUBCUTANEOUS at 22:01

## 2021-09-12 NOTE — PROGRESS NOTE ADULT - ASSESSMENT
MEDICATIONS  (STANDING):  amitriptyline Oral Tab/Cap - Peds 30 milliGRAM(s) Oral at bedtime  atorvastatin 10 milliGRAM(s) Oral at bedtime  azithromycin   Tablet 500 milliGRAM(s) Oral daily  cefTRIAXone Injectable. 1000 milliGRAM(s) IV Push every 24 hours  cholecalciferol 1000 Unit(s) Oral daily  clopidogrel Tablet 75 milliGRAM(s) Oral daily  enoxaparin Injectable 40 milliGRAM(s) SubCutaneous daily  guaiFENesin  milliGRAM(s) Oral every 12 hours  influenza   Vaccine 0.5 milliLiter(s) IntraMuscular once  methylPREDNISolone sodium succinate Injectable 40 milliGRAM(s) IV Push once  metoprolol succinate ER 25 milliGRAM(s) Oral every 12 hours  multivitamin 1 Tablet(s) Oral daily  pantoprazole    Tablet 40 milliGRAM(s) Oral before breakfast  sodium chloride 0.9% with potassium chloride 20 mEq/L 1000 milliLiter(s) (75 mL/Hr) IV Continuous <Continuous>    MEDICATIONS  (PRN):  acetaminophen   Tablet .. 650 milliGRAM(s) Oral every 6 hours PRN Mild Pain (1 - 3)  aluminum hydroxide/magnesium hydroxide/simethicone Suspension 30 milliLiter(s) Oral every 4 hours PRN Dyspepsia  calcium carbonate    500 mG (Tums) Chewable 3 Tablet(s) Chew every 6 hours PRN Dyspepsia  hydrocodone/homatropine Syrup 5 milliLiter(s) Oral every 6 hours PRN Cough  melatonin 5 milliGRAM(s) Oral at bedtime PRN Insomnia  metoclopramide Injectable 10 milliGRAM(s) IV Push every 6 hours PRN Nausea and/or Vomiting  ondansetron Injectable 4 milliGRAM(s) IV Push every 6 hours PRN Nausea  senna 2 Tablet(s) Oral at bedtime PRN Constipation      #Severe Sepsis  #Bilateral Pneumonia  #Acute Kidney Injury  #Hypovolemia Hyponatramia  -Admit to medicine  -IV Rocephin/PO Azithromycin  -Cultures ordered abd oebdubg  -Albuterol MDI  -IV as needed for fluid balance  -CTA findings reviewed ,.  -ON admission no O2 supplementation needed. Apply O2 if necessary to maintain SpO2>90%  -Symptomatic Rx. Cough uncontrolled. Switch to Hycodan. Solumedrol x 1. PO prednisone x 1; Of note leukocytosis improved. Possible increased leukocytosis to follow with steroid administration.   -In the setting of reported weight gain despite decreased intake and increased PO intake, order TTE.       #HLD. Continue home meds. Of note zetia not on formulary.   #HTN. Home medications. ARB hold in the setting of JIMMY  #GERD. Continue PPI.       DVT Prophylaxis: Lovenox subq  Code Status: Full Code

## 2021-09-12 NOTE — PROGRESS NOTE ADULT - SUBJECTIVE AND OBJECTIVE BOX
PT ACUTE  Treatment Session          Pt seen on [facility] nursing unit.                                                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]  Severe sepsis (CMS/[facility]) [A41.9, R65.20]                                          SUBJECTIVE: Patient's Personal Goal: breathe easier, return home (12/01/18 1411)  Subjective: pt agreeable to therapy \"you're going to have to give me a minute though, I just got over here to the chair\"  (12/01/18 1411)  Subjective/Objective Comments: chart reviewed, session cleared with RN, Long, prior to start (12/01/18 1411)    OBJECTIVE:  Basic Lines: O2;Continuous pulse oximetry;Telemetry(8L O2) (12/01/18 1411)  Safety Measures: Other (comment)(call light in reach, wife in room) (12/01/18 1411)    RN reported Davis Fall Scale Score: 35    ASSESSMENT:   Patient continues to present below baseline for functional mobility. Patient currently requires assist for bed mobility, supervision assist for transfers and supervision for short distance ambulation. Focus of today's session included mobility assessment and LE there ex. Patient would benefit from skilled therapy services during hospital stay to further address listed deficits. Patient is likely to discharge home with home therapy and family support, pending progress.      Other Therapeutic Intervention: recover time for pt- educated on deep breathing and energy conservation techniques (12/01/18 1411)     EDUCATION:   On this date, the patient and patient's spouse was educated on progression of activity, safety with mobility, importance of participation 
in therapy and activity throughout the day with staff.    The response to education was: Needs reinforcement    PT Identified Barriers to Discharge: dyspnea, decreased activity tolerance     PLAN:   Continue skilled PT, including the following Treatment/Interventions: Functional transfer training;Strengthening;Endurance training;Bed mobility;Gait training;Stairs retraining;Safety Education;Neuromuscular re-education (12/01/18 1411)   Frequency Comments: X, M-F (12/01/18 1411)    Treatment Plan for Next Session: progress ambulation distance, safety awareness, step assessment when appropriate  Additional Plan Considerations: assess for ICU aide program.         RECOMMENDATIONS FOR DISCHARGE:  Recommendation for Discharge: PT: Home;Home therapy(family support, pending progress) (12/01/18 1411)    PT/OT Mobility Equipment for Discharge: pt does not own cane or ww, will monitor needs (12/01/18 1411)  PT/OT ADL Equipment for Discharge: continue to assess (12/01/18 1411)     Assistance needed when returning home:   Not discussed at this time due to discharge plan/needs not established.  Will continue to address as hospital stay progresses.       ICU Mobility Assesment (PERME):       Last 24 hours of Functional Data  Bed Mobility   Bed Mobility  Bed Mobility Comments: not assessed, pt up in chair pre/post session (12/01/18 1411)    Transfers  Transfers  Sit to Stand: Supervision (Supv) (12/01/18 1411)  Stand to Sit: Supervision (Supv) (12/01/18 1411)  Stand Pivot Transfers: Supervision (Supv) (12/01/18 1411)  Transfer Comments 1: for safety, cues for slowing pace and safety awareness, no LOB demonstrated (12/01/18 1411)      Gait  Gait  Gait Assistance: Supervision (Supv) (12/01/18 1411)  Assistive Device/: 1 Person;Gait Belt (12/01/18 1411)  Ambulation Distance (Feet): 15 Feet (12/01/18 1411)  Pattern: Shuffle (12/01/18 1411)  Ambulation Surface: Tile (12/01/18 1411)  Gait Comments 1: for safety, quick pace, 
assistance with line management, cues to slow pace and for safety awareness (12/01/18 1411)  Gait Comments 2: deferred further distance due to GARCIA (12/01/18 1411),      Balance  Balance  Sitting - Static: Independent (12/01/18 1411)  Sitting - Dynamic: Supervision (Supv) (12/01/18 1411)  Standing - Static: Supervision (Supv) (12/01/18 1411)  Standing - Dynamic (eyes open): Supervision (Supv) (12/01/18 1411)  Balance Comments #1: for safety, no AD used, quick pace and GARCIA limiting stability (12/01/18 1411)    Patient's Personal Goal: breathe easier, return home (12/01/18 1411)    Therapy Goals:    Goals  Short Term Goals to Be Reviewed On: 12/07/18 (12/01/18 1411)  Short Term Goals = Discharge Goals: Yes (11/30/18 0801)  Goal Agreement: Patient agrees with goals and treatment plan (11/30/18 0801)  Bed Mobility Discharge Goal: sit<>supine modified independent (11/30/18 0801)  Transfer Discharge Goal: sit<>stand modified independent with A/D prn.   (11/30/18 0801)  Ambulation Discharge Goal: 150' with A/D prn, modified independent (11/30/18 0801)  Stairs Discharge Goal: 11 steps with rail, modified independent (11/30/18 0801)        PT Time Spent: 40 minutes (12/01/18 1411)    See PT flowsheet for full details regarding the PT therapy provided.        
CC: Cough/SOB/ JI  SUBJECTIVE: IMproved SOB. Cough still uncontrolled causing discomfort and fatigue. Denies fever, chills, nausea, vomiting, abdominal pain/discomfort  Review of Systems: 14 Point review of systems reviewed and reported as negative unless otherwise stated in Subjective    FROM H&P:    "80M with PMH of Prostate Ca (Jace 6), HTN, HLD, Arrythmia NOS presents with progressive SOB and cough with exertion over the last several days. No sick contacts. No COVID-19 contacts.  No recent tracel. COVID-19 vaccination with J&J 5/2021. Reports unquantifiable weight gain despite decreased PO intake. Cough is productive with yellow sputum. Associated fatigue and lethargy Denies syncope, dizziness, chest pain, nausea, vomiting, abdominal pain/discomfort,     In the ED, Afebrile with Tachycardia (), WBC 13.31, Na 126 and Cr 1.49, NT Pro-. Troponin negative. CTA chest negative for PE but with multifocal pneumonia. COVID-19 PCR negative."      T(C): 37 (09-12-21 @ 08:44), Max: 37 (09-12-21 @ 08:44)  HR: 76 (09-12-21 @ 08:44) (76 - 111)  BP: 125/62 (09-12-21 @ 08:44) (112/64 - 125/62)  RR: 18 (09-12-21 @ 08:44) (17 - 18)  SpO2: 95% (09-12-21 @ 08:44) (95% - 98%)  General: AAOx3; NAD  Head: AT/NC  ENT: Moist Mucous Membranes; No Injury  Neck: Non-tender; No JVD  CVS: RRR, S1&S2, No murmur, No edema  Respiratory: Decreased bibasilar breath sounds; Normal Respiratory Effort and saturation on RA  Abdomen/GI: Soft, non-tender, non-distended, no guarding, no rebound, normal bowel sounds  : No bladder distention, No Mchugh  Extremites: No cyanosis, No clubbing, No edema  MSK: No CVA tenderness, Normal ROM, No injury  Neuro: AAOx3, CNII-XII grossly intact, non-focal  Psych: Appropriate, Cooperative  Skin: Clean, Dry and Intact                          13.8   10.96 )-----------( 196      ( 12 Sep 2021 06:59 )             38.8     09-12    132<L>  |  99  |  16  ----------------------------<  123<H>  3.8   |  28  |  0.99    Ca    8.2<L>      12 Sep 2021 06:59  Mg     2.3     09-11    TPro  6.5  /  Alb  2.9<L>  /  TBili  0.7  /  DBili  x   /  AST  39<H>  /  ALT  50  /  AlkPhos  65  09-12    COVID-19 PCR: NotDetec (11 Sep 2021 11:30)    CAPILLARY BLOOD GLUCOSE    < from: CT Angio Chest PE Protocol w/ IV Cont (09.11.21 @ 13:00) >  IMPRESSION:  No pulmonary embolism. Multifocal pneumonia.    < end of copied text >    I reviewed labs, imaging, orders and vitals.           
Passed

## 2021-09-13 ENCOUNTER — TRANSCRIPTION ENCOUNTER (OUTPATIENT)
Age: 81
End: 2021-09-13

## 2021-09-13 VITALS
DIASTOLIC BLOOD PRESSURE: 68 MMHG | SYSTOLIC BLOOD PRESSURE: 126 MMHG | TEMPERATURE: 98 F | HEART RATE: 99 BPM | RESPIRATION RATE: 18 BRPM | OXYGEN SATURATION: 95 %

## 2021-09-13 LAB
ANION GAP SERPL CALC-SCNC: 4 MMOL/L — LOW (ref 5–17)
BASOPHILS # BLD AUTO: 0.02 K/UL — SIGNIFICANT CHANGE UP (ref 0–0.2)
BASOPHILS NFR BLD AUTO: 0.2 % — SIGNIFICANT CHANGE UP (ref 0–2)
BUN SERPL-MCNC: 17 MG/DL — SIGNIFICANT CHANGE UP (ref 7–23)
CALCIUM SERPL-MCNC: 8.8 MG/DL — SIGNIFICANT CHANGE UP (ref 8.5–10.1)
CHLORIDE SERPL-SCNC: 102 MMOL/L — SIGNIFICANT CHANGE UP (ref 96–108)
CO2 SERPL-SCNC: 27 MMOL/L — SIGNIFICANT CHANGE UP (ref 22–31)
CREAT SERPL-MCNC: 0.87 MG/DL — SIGNIFICANT CHANGE UP (ref 0.5–1.3)
EOSINOPHIL # BLD AUTO: 0 K/UL — SIGNIFICANT CHANGE UP (ref 0–0.5)
EOSINOPHIL NFR BLD AUTO: 0 % — SIGNIFICANT CHANGE UP (ref 0–6)
GLUCOSE SERPL-MCNC: 123 MG/DL — HIGH (ref 70–99)
HCT VFR BLD CALC: 41.7 % — SIGNIFICANT CHANGE UP (ref 39–50)
HGB BLD-MCNC: 14.3 G/DL — SIGNIFICANT CHANGE UP (ref 13–17)
IMM GRANULOCYTES NFR BLD AUTO: 1.6 % — HIGH (ref 0–1.5)
LYMPHOCYTES # BLD AUTO: 1.31 K/UL — SIGNIFICANT CHANGE UP (ref 1–3.3)
LYMPHOCYTES # BLD AUTO: 12.8 % — LOW (ref 13–44)
MCHC RBC-ENTMCNC: 31.1 PG — SIGNIFICANT CHANGE UP (ref 27–34)
MCHC RBC-ENTMCNC: 34.3 GM/DL — SIGNIFICANT CHANGE UP (ref 32–36)
MCV RBC AUTO: 90.7 FL — SIGNIFICANT CHANGE UP (ref 80–100)
MONOCYTES # BLD AUTO: 0.67 K/UL — SIGNIFICANT CHANGE UP (ref 0–0.9)
MONOCYTES NFR BLD AUTO: 6.6 % — SIGNIFICANT CHANGE UP (ref 2–14)
NEUTROPHILS # BLD AUTO: 8.04 K/UL — HIGH (ref 1.8–7.4)
NEUTROPHILS NFR BLD AUTO: 78.8 % — HIGH (ref 43–77)
PLATELET # BLD AUTO: 246 K/UL — SIGNIFICANT CHANGE UP (ref 150–400)
POTASSIUM SERPL-MCNC: 4.1 MMOL/L — SIGNIFICANT CHANGE UP (ref 3.5–5.3)
POTASSIUM SERPL-SCNC: 4.1 MMOL/L — SIGNIFICANT CHANGE UP (ref 3.5–5.3)
RBC # BLD: 4.6 M/UL — SIGNIFICANT CHANGE UP (ref 4.2–5.8)
RBC # FLD: 12.7 % — SIGNIFICANT CHANGE UP (ref 10.3–14.5)
SODIUM SERPL-SCNC: 133 MMOL/L — LOW (ref 135–145)
WBC # BLD: 10.2 K/UL — SIGNIFICANT CHANGE UP (ref 3.8–10.5)
WBC # FLD AUTO: 10.2 K/UL — SIGNIFICANT CHANGE UP (ref 3.8–10.5)

## 2021-09-13 PROCEDURE — 99239 HOSP IP/OBS DSCHRG MGMT >30: CPT

## 2021-09-13 PROCEDURE — 93306 TTE W/DOPPLER COMPLETE: CPT | Mod: 26

## 2021-09-13 RX ORDER — LEVOFLOXACIN 5 MG/ML
1 INJECTION, SOLUTION INTRAVENOUS
Qty: 4 | Refills: 0
Start: 2021-09-13 | End: 2021-09-16

## 2021-09-13 RX ORDER — METOPROLOL TARTRATE 50 MG
1 TABLET ORAL
Qty: 0 | Refills: 0 | DISCHARGE

## 2021-09-13 RX ORDER — HYDROCODONE BITARTRATE AND HOMATROPINE METHYLBROMIDE 5; 1.5 MG/5ML; MG/5ML
5 SOLUTION ORAL
Qty: 100 | Refills: 0
Start: 2021-09-13

## 2021-09-13 RX ORDER — ALBUTEROL 90 UG/1
1 AEROSOL, METERED ORAL
Qty: 1 | Refills: 0
Start: 2021-09-13

## 2021-09-13 RX ORDER — METOPROLOL TARTRATE 50 MG
1 TABLET ORAL
Qty: 0 | Refills: 0 | DISCHARGE
Start: 2021-09-13

## 2021-09-13 RX ORDER — JNJ-78436735 50000000000 [PFU]/.5ML
0.5 SUSPENSION INTRAMUSCULAR
Qty: 0 | Refills: 0 | DISCHARGE

## 2021-09-13 RX ORDER — LANOLIN ALCOHOL/MO/W.PET/CERES
1 CREAM (GRAM) TOPICAL
Qty: 0 | Refills: 0 | DISCHARGE

## 2021-09-13 RX ADMIN — Medication 20 MILLIGRAM(S): at 10:57

## 2021-09-13 RX ADMIN — CEFTRIAXONE 1000 MILLIGRAM(S): 500 INJECTION, POWDER, FOR SOLUTION INTRAMUSCULAR; INTRAVENOUS at 13:20

## 2021-09-13 RX ADMIN — SODIUM CHLORIDE 75 MILLILITER(S): 9 INJECTION INTRAMUSCULAR; INTRAVENOUS; SUBCUTANEOUS at 11:00

## 2021-09-13 RX ADMIN — Medication 25 MILLIGRAM(S): at 10:56

## 2021-09-13 RX ADMIN — PANTOPRAZOLE SODIUM 40 MILLIGRAM(S): 20 TABLET, DELAYED RELEASE ORAL at 06:19

## 2021-09-13 RX ADMIN — ENOXAPARIN SODIUM 40 MILLIGRAM(S): 100 INJECTION SUBCUTANEOUS at 10:56

## 2021-09-13 RX ADMIN — AZITHROMYCIN 500 MILLIGRAM(S): 500 TABLET, FILM COATED ORAL at 10:57

## 2021-09-13 RX ADMIN — Medication 600 MILLIGRAM(S): at 10:56

## 2021-09-13 RX ADMIN — Medication 1 TABLET(S): at 10:56

## 2021-09-13 RX ADMIN — CLOPIDOGREL BISULFATE 75 MILLIGRAM(S): 75 TABLET, FILM COATED ORAL at 10:56

## 2021-09-13 RX ADMIN — Medication 1000 UNIT(S): at 10:56

## 2021-09-13 NOTE — DISCHARGE NOTE PROVIDER - CARE PROVIDERS DIRECT ADDRESSES
[email or web address]

## 2021-09-13 NOTE — DISCHARGE NOTE PROVIDER - NSDCMRMEDTOKEN_GEN_ALL_CORE_FT
Albuterol (Eqv-ProAir HFA) 90 mcg/inh inhalation aerosol: 1 puff(s) inhaled every 6 hours, As Needed -for shortness of breath and/or wheezing   amitriptyline 10 mg oral tablet: 3 tab(s) orally once a day (at bedtime)  benzonatate 100 mg oral capsule: 1 cap(s) orally 3 times a day, As Needed - for cough  Co Q-10: 200 milligram(s) orally once a day  guaiFENesin 600 mg oral tablet, extended release: 1 tab(s) orally every 12 hours  Hycodan 5 mg-1.5 mg/5 mL oral syrup: 5 milliliter(s) orally every 6 hours, As Needed -for cough MDD:20mL  irbesartan 300 mg oral tablet: 1 tab(s) orally once a day  levoFLOXacin 500 mg oral tablet: 1 tab(s) orally once a day;   Livalo 2 mg oral tablet: 1 tab(s) orally once a day (at bedtime)  multivitamin: 1 tab(s) orally once a day  pantoprazole 40 mg oral delayed release tablet: 1 tab(s) orally once a day  Plavix 75 mg oral tablet: 1 tab(s) orally once a day  predniSONE 5 mg oral tablet: 1 tab(s) orally once a day as directed: 20mg (4 tabs) qd Days 1-3, 10mg (2 tabs) qd Days 4-6, 5mg (1 tab) qd Days 7-9.   Vitamin D3 1000 intl units oral capsule: 1 cap(s) orally once a day  Zetia 10 mg oral tablet: 1 tab(s) orally once a day (at bedtime)

## 2021-09-13 NOTE — DISCHARGE NOTE PROVIDER - PROVIDER TOKENS
PROVIDER:[TOKEN:[[id number]],FOLLOWUP:[1 week],ESTABLISHEDPATIENT:[T]],PROVIDER:[TOKEN:[[id number]],FOLLOWUP:[1 week],ESTABLISHEDPATIENT:[T]]

## 2021-09-13 NOTE — DISCHARGE NOTE NURSING/CASE MANAGEMENT/SOCIAL WORK - PATIENT PORTAL LINK FT
You can access the FollowMyHealth Patient Portal offered by [facility] by registering at the following website: [email or web address]. By joining [employer]’s FollowMyHealth portal, you will also be able to view your health information using other applications (apps) compatible with our system.

## 2021-09-13 NOTE — DISCHARGE NOTE PROVIDER - NSDCCPCAREPLAN_GEN_ALL_CORE_FT
PRINCIPAL DISCHARGE DIAGNOSIS  Diagnosis: Multifocal pneumonia  Assessment and Plan of Treatment:       SECONDARY DISCHARGE DIAGNOSES  Diagnosis: Hypokalemia  Assessment and Plan of Treatment:     Diagnosis: Hyponatremia  Assessment and Plan of Treatment:     Diagnosis: JIMMY (acute kidney injury)  Assessment and Plan of Treatment:     Diagnosis: Severe sepsis  Assessment and Plan of Treatment:

## 2021-09-13 NOTE — DISCHARGE NOTE PROVIDER - HOSPITAL COURSE
FROM H&P:    "80M with PMH of Prostate Ca (Jace 6), HTN, HLD, Arrythmia NOS presents with progressive SOB and cough with exertion over the last several days. No sick contacts. No COVID-19 contacts.  No recent tracel. COVID-19 vaccination with J&J 5/2021. Reports unquantifiable weight gain despite decreased PO intake. Cough is productive with yellow sputum. Associated fatigue and lethargy Denies syncope, dizziness, chest pain, nausea, vomiting, abdominal pain/discomfort,     In the ED, Afebrile with Tachycardia (), WBC 13.31, Na 126 and Cr 1.49, NT Pro-. Troponin negative. CTA chest negative for PE but with multifocal pneumonia. COVID-19 PCR negative. "      #Severe Sepsis  #Bilateral Pneumonia  #Acute Kidney Injury  #Hypovolemia Hyponatramia  -Admit to medicine  -IV Rocephin/PO Azithromycin  -Cultures ordered abd oebdubg  -Albuterol MDI  -IV as needed for fluid balance. Diurectic held at admission and at discharge for hyponatremial.   -CTA findings reviewed ,.  -ON admission no O2 supplementation needed. Apply O2 if necessary to maintain SpO2>90%  -Symptomatic Rx. Cough uncontrolled. Switch to Hycodan. Solumedrol x 1. PO prednisone x 1; Of note leukocytosis improved. Possible increased leukocytosis to follow with steroid administration.   -In the setting of reported weight gain despite decreased intake and increased PO intake, Echo with EF 70%; Moderate pulmonary HTN; Mild TR and MR. Close outpatient follow up with patient's cardiologist advised.       #HLD. Continue home meds. Of note zetia not on formulary.   #HTN. Home medications. ARB hold in the setting of JIMMY. Restarted at discharge. Diurectic held at discharge.   #GERD. Continue PPI.       DVT Prophylaxis: Lovenox subq  Code Status: Full Code  Disposition: Symptomatically improved. Afebrile. Cough improved. Saturating well on RA. Echo grossly unremarkable for acute process. Discharge home in stable condition and close outpatient follow up with PMD and Cardiology.

## 2021-09-13 NOTE — DISCHARGE NOTE PROVIDER - CARE PROVIDER_API CALL
Mike Mathew  INTERNAL MEDICINE  [address] [place] [address]  Phone: [phone]  Fax: [phone]  Established Patient  Follow Up Time: 1 week    Jim Delaney (MD)  Cardiovascular Disease  [address], [address]  [place] [address]  Phone: [phone]  Fax: [phone]  Established Patient  Follow Up Time: 1 week

## 2021-09-15 ENCOUNTER — NON-APPOINTMENT (OUTPATIENT)
Age: 81
End: 2021-09-15

## 2021-09-15 PROBLEM — I48.91 UNSPECIFIED ATRIAL FIBRILLATION: Chronic | Status: ACTIVE | Noted: 2021-09-11

## 2021-09-16 ENCOUNTER — APPOINTMENT (OUTPATIENT)
Dept: CARE COORDINATION | Facility: HOME HEALTH | Age: 81
End: 2021-09-16
Payer: MEDICARE

## 2021-09-16 DIAGNOSIS — Z78.9 OTHER SPECIFIED HEALTH STATUS: ICD-10-CM

## 2021-09-16 DIAGNOSIS — Z80.0 FAMILY HISTORY OF MALIGNANT NEOPLASM OF DIGESTIVE ORGANS: ICD-10-CM

## 2021-09-16 DIAGNOSIS — Z82.49 FAMILY HISTORY OF ISCHEMIC HEART DISEASE AND OTHER DISEASES OF THE CIRCULATORY SYSTEM: ICD-10-CM

## 2021-09-16 DIAGNOSIS — Z87.891 PERSONAL HISTORY OF NICOTINE DEPENDENCE: ICD-10-CM

## 2021-09-16 LAB
CULTURE RESULTS: SIGNIFICANT CHANGE UP
CULTURE RESULTS: SIGNIFICANT CHANGE UP
SPECIMEN SOURCE: SIGNIFICANT CHANGE UP
SPECIMEN SOURCE: SIGNIFICANT CHANGE UP

## 2021-09-16 PROCEDURE — 99349 HOME/RES VST EST MOD MDM 40: CPT

## 2021-09-17 VITALS
HEART RATE: 79 BPM | DIASTOLIC BLOOD PRESSURE: 78 MMHG | RESPIRATION RATE: 18 BRPM | SYSTOLIC BLOOD PRESSURE: 138 MMHG | OXYGEN SATURATION: 94 %

## 2021-09-17 DIAGNOSIS — Z85.46 PERSONAL HISTORY OF MALIGNANT NEOPLASM OF PROSTATE: ICD-10-CM

## 2021-09-17 DIAGNOSIS — E78.5 HYPERLIPIDEMIA, UNSPECIFIED: ICD-10-CM

## 2021-09-17 DIAGNOSIS — R65.20 SEVERE SEPSIS WITHOUT SEPTIC SHOCK: ICD-10-CM

## 2021-09-17 DIAGNOSIS — I48.91 UNSPECIFIED ATRIAL FIBRILLATION: ICD-10-CM

## 2021-09-17 DIAGNOSIS — I08.1 RHEUMATIC DISORDERS OF BOTH MITRAL AND TRICUSPID VALVES: ICD-10-CM

## 2021-09-17 DIAGNOSIS — E87.6 HYPOKALEMIA: ICD-10-CM

## 2021-09-17 DIAGNOSIS — N17.9 ACUTE KIDNEY FAILURE, UNSPECIFIED: ICD-10-CM

## 2021-09-17 DIAGNOSIS — J18.9 PNEUMONIA, UNSPECIFIED ORGANISM: ICD-10-CM

## 2021-09-17 DIAGNOSIS — K21.9 GASTRO-ESOPHAGEAL REFLUX DISEASE WITHOUT ESOPHAGITIS: ICD-10-CM

## 2021-09-17 DIAGNOSIS — I27.20 PULMONARY HYPERTENSION, UNSPECIFIED: ICD-10-CM

## 2021-09-17 DIAGNOSIS — E87.1 HYPO-OSMOLALITY AND HYPONATREMIA: ICD-10-CM

## 2021-09-17 DIAGNOSIS — I10 ESSENTIAL (PRIMARY) HYPERTENSION: ICD-10-CM

## 2021-09-17 DIAGNOSIS — Z82.49 FAMILY HISTORY OF ISCHEMIC HEART DISEASE AND OTHER DISEASES OF THE CIRCULATORY SYSTEM: ICD-10-CM

## 2021-09-17 DIAGNOSIS — A41.9 SEPSIS, UNSPECIFIED ORGANISM: ICD-10-CM

## 2021-09-17 DIAGNOSIS — Z80.0 FAMILY HISTORY OF MALIGNANT NEOPLASM OF DIGESTIVE ORGANS: ICD-10-CM

## 2021-09-17 DIAGNOSIS — Z88.2 ALLERGY STATUS TO SULFONAMIDES: ICD-10-CM

## 2021-09-17 DIAGNOSIS — Z87.891 PERSONAL HISTORY OF NICOTINE DEPENDENCE: ICD-10-CM

## 2021-09-17 PROBLEM — Z78.9 SOCIAL ALCOHOL USE: Status: ACTIVE | Noted: 2021-09-17

## 2021-09-17 RX ORDER — UBIDECARENONE 200 MG
CAPSULE ORAL DAILY
Refills: 0 | Status: ACTIVE | COMMUNITY

## 2021-09-17 RX ORDER — METOPROLOL SUCCINATE 25 MG/1
25 TABLET, EXTENDED RELEASE ORAL
Refills: 0 | Status: ACTIVE | COMMUNITY

## 2021-09-17 RX ORDER — AMLODIPINE BESYLATE 10 MG/1
10 TABLET ORAL DAILY
Qty: 90 | Refills: 3 | Status: DISCONTINUED | COMMUNITY
Start: 2021-03-22 | End: 2021-09-17

## 2021-09-17 RX ORDER — AMOXICILLIN 500 MG/1
500 CAPSULE ORAL
Qty: 21 | Refills: 0 | Status: DISCONTINUED | COMMUNITY
Start: 2021-05-28

## 2021-09-17 RX ORDER — IRBESARTAN 300 MG/1
300 TABLET ORAL DAILY
Refills: 0 | Status: ACTIVE | COMMUNITY

## 2021-09-17 RX ORDER — AZITHROMYCIN 250 MG/1
250 TABLET, FILM COATED ORAL
Qty: 6 | Refills: 0 | Status: DISCONTINUED | COMMUNITY
Start: 2021-09-10

## 2021-09-17 RX ORDER — AMOXICILLIN AND CLAVULANATE POTASSIUM 562.5; 437.5; 62.5 MG/1; MG/1; MG/1
1000-62.5 TABLET, MULTILAYER, EXTENDED RELEASE ORAL
Qty: 40 | Refills: 0 | Status: DISCONTINUED | COMMUNITY
Start: 2021-09-10

## 2021-09-17 RX ORDER — MULTIVIT-MIN/FOLIC/VIT K/LYCOP 400-300MCG
25 MCG TABLET ORAL
Refills: 0 | Status: ACTIVE | COMMUNITY

## 2021-09-17 RX ORDER — HYDROCHLOROTHIAZIDE 12.5 MG/1
12.5 CAPSULE ORAL
Qty: 180 | Refills: 0 | Status: DISCONTINUED | COMMUNITY
Start: 2021-09-04

## 2021-09-17 RX ORDER — AMITRIPTYLINE HYDROCHLORIDE 10 MG/1
10 TABLET, FILM COATED ORAL
Refills: 0 | Status: ACTIVE | COMMUNITY
Start: 2021-09-17

## 2021-09-17 RX ORDER — NIACINAMIDE 500 MG
500 TABLET ORAL TWICE DAILY
Refills: 0 | Status: DISCONTINUED | COMMUNITY
End: 2021-09-17

## 2021-09-17 RX ORDER — PANTOPRAZOLE 40 MG/1
40 TABLET, DELAYED RELEASE ORAL
Qty: 90 | Refills: 0 | Status: DISCONTINUED | COMMUNITY
Start: 2021-03-22

## 2021-09-17 NOTE — PLAN
[FreeTextEntry1] : 1. Pneumonia:\par - s/p hospitalization for pneumonia with severe sepsis\par - s/p IV rochephin, zithromax.\par - complete prednisone taper and levaquin on DC\par - mucinex, robitussin AC, tessalon, albuterol MDI prn\par - f/u Pulm on 9/1\par - call for worsening sx\par \par 2. HTN.\par - ARB hold in the setting of JIMMY. Restarted at discharge. Diuretic  held at discharge. \par - con't ibesartan, metoprolol\par \par 3. Hyperlipidemia:\par - low fat, low chol diet\par - con't livalo, zetia\par

## 2021-09-17 NOTE — ASSESSMENT
[FreeTextEntry1] : PT is being seen after discharge home from [facility]. He was admitted on 09/11/2021 for sob/cOUGH and discharged home on 09/13/2021.\par \par CC:\par Pt is seen at home s/p recent admission for PNA. Pt is temporarily unable to leave home as it requires a considerable and taxing effort\par HPI:\par Pt Is a 81 y/o male enrolled in the [facility] program seen at home s/p a recent admission for PNA \par \par Reason for Admission	Cough & SOB & Bilateral pneumonia\par Hospital Course	\par FROM H&P:\par \par "80M with PMH of Prostate Ca ([place] 6), HTN, HLD, Arrythmia NOS presents with progressive SOB and cough with exertion over the last several days. No sick contacts. No COVID-19 contacts.  No recent tracel. COVID-19 vaccination with J&J 5/2021. Reports unquantifiable weight gain despite decreased PO intake. Cough is productive with yellow sputum. Associated fatigue and lethargy Denies syncope, dizziness, chest pain, nausea, vomiting, abdominal pain/discomfort, \par \par In the ED, Afebrile with Tachycardia (), WBC 13.31, Na 126 and Cr 1.49, NT Pro-. Troponin negative. CTA chest negative for PE but with multifocal pneumonia. COVID-19 PCR negative. "\par \par #Severe Sepsis\par #Bilateral Pneumonia\par #Acute Kidney Injury\par #Hypovolemia Hyponatramia\par -Admit to medicine\par -IV Rocephin/PO Azithromycin\par -Cultures ordered abd oebdubg\par -Albuterol MDI\par -IV as needed for fluid balance. Diurectic held at admission and at discharge for hyponatremial. \par -CTA findings reviewed ,.\par -ON admission no O2 supplementation needed. Apply O2 if necessary to maintain SpO2>90%\par -Symptomatic Rx. Cough uncontrolled. Switch to Hycodan. Solumedrol x 1. PO prednisone x 1; Of note leukocytosis improved. Possible increased leukocytosis to follow with steroid administration. \par -In the setting of reported weight gain despite decreased intake and increased PO intake, Echo with EF 70%; Moderate pulmonary HTN; Mild TR and MR. Close outpatient follow up with patient's cardiologist advised. \par \par Upon examination A&O x 3 NAD. Denies CP, SOB, headache, dizziness, pain, abd discomfort or difficulty with bowel or bladder. He c/o dry cough which is slowly improving. He lives with his wife, declined home care services. Seeing Pulm on 9/17.\par \par \par \par \par

## 2021-09-17 NOTE — HISTORY OF PRESENT ILLNESS
[Post-hospitalization from ___ Hospital] : Post-hospitalization from [unfilled] Hospital [Admitted on: ___] : The patient was admitted on [unfilled] [Discharged on ___] : discharged on [unfilled] [FreeTextEntry2] : FROM [facility] MILLIE PROVIDER\par Discharge Note Provider [Charted Location: [facility] [address] [address] 02] [Authored: 13-Sep-2021 14:00]- for Visit: [id number], Complete, Entered, Signed in Full, General\par \par \par  Hospital Course:\par Discharge Date	13-Sep-2021\par Admission Date	11-Sep-2021 13:29\par Reason for Admission	Cough & SOB & Bilateral pneumonia\par Hospital Course	\par FROM H&P:\par \par "80M with PMH of Prostate Ca (Jace 6), HTN, HLD, Arrythmia NOS presents with progressive SOB and cough with exertion over the last several days. No sick contacts. No COVID-19 contacts.  No recent tracel. COVID-19 vaccination with J&J 5/2021. Reports unquantifiable weight gain despite decreased PO intake. Cough is productive with yellow sputum. Associated fatigue and lethargy Denies syncope, dizziness, chest pain, nausea, vomiting, abdominal pain/discomfort, \par \par In the ED, Afebrile with Tachycardia (), WBC 13.31, Na 126 and Cr 1.49, NT Pro-. Troponin negative. CTA chest negative for PE but with multifocal pneumonia. COVID-19 PCR negative. "\par \par \par #Severe Sepsis\par #Bilateral Pneumonia\par #Acute Kidney Injury\par #Hypovolemia Hyponatramia\par -Admit to medicine\par -IV Rocephin/PO Azithromycin\par -Cultures ordered abd oebdubg\par -Albuterol MDI\par -IV as needed for fluid balance. Diurectic held at admission and at discharge for hyponatremial. \par -CTA findings reviewed ,.\par -ON admission no O2 supplementation needed. Apply O2 if necessary to maintain SpO2>90%\par -Symptomatic Rx. Cough uncontrolled. Switch to Hycodan. Solumedrol x 1. PO prednisone x 1; Of note leukocytosis improved. Possible increased leukocytosis to follow with steroid administration. \par -In the setting of reported weight gain despite decreased intake and increased PO intake, Echo with EF 70%; Moderate pulmonary HTN; Mild TR and MR. Close outpatient follow up with patient's cardiologist advised. \par \par \par #HLD. Continue home meds. Of note zetia not on formulary. \par #HTN. Home medications. ARB hold in the setting of JIMMY. Restarted at discharge. Diurectic held at discharge. \par #GERD. Continue PPI. \par \par \par DVT Prophylaxis: Lovenox subq\par Code Status: Full Code\par Disposition: Symptomatically improved. Afebrile. Cough improved. Saturating well on RA. Echo grossly unremarkable for acute process. Discharge home in stable condition and close outpatient follow up with PMD and Cardiology.\par \par  Med Reconciliation:\par Medication Reconciliation Status	Admission Reconciliation is Not Complete\par Discharge Reconciliation is Completed\par Discharge Medications	Albuterol (Eqv-ProAir HFA) 90 mcg/inh inhalation aerosol: 1 puff(s) inhaled every 6 hours, As Needed -for shortness of breath and/or wheezing \par amitriptyline 10 mg oral tablet: 3 tab(s) orally once a day (at bedtime)\par benzonatate 100 mg oral capsule: 1 cap(s) orally 3 times a day, As Needed - for cough\par Co Q-10: 200 milligram(s) orally once a day\par guaiFENesin 600 mg oral tablet, extended release: 1 tab(s) orally every 12 hours\par Hycodan 5 mg-1.5 mg/5 mL oral syrup: 5 milliliter(s) orally every 6 hours, As Needed -for cough MDD:20mL\par irbesartan 300 mg oral tablet: 1 tab(s) orally once a day\par levoFLOXacin 500 mg oral tablet: 1 tab(s) orally once a day; \par Livalo 2 mg oral tablet: 1 tab(s) orally once a day (at bedtime)\par multivitamin: 1 tab(s) orally once a day\par pantoprazole 40 mg oral delayed release tablet: 1 tab(s) orally once a day\par Plavix 75 mg oral tablet: 1 tab(s) orally once a day\par predniSONE 5 mg oral tablet: 1 tab(s) orally once a day as directed: 20mg (4 tabs) qd Days 1-3, 10mg (2 tabs) qd Days 4-6, 5mg (1 tab) qd Days 7-9. \par Vitamin D3 1000 intl units oral capsule: 1 cap(s) orally once a day\par Zetia 10 mg oral tablet: 1 tab(s) orally once a day (at bedtime)\par ,\par ,\par \par  Care Plan/Procedures:\par Discharge Diagnoses, Assessment and Plan of Treatment	PRINCIPAL DISCHARGE DIAGNOSIS\par Diagnosis: Multifocal pneumonia\par Assessment and Plan of Treatment: \par \par \par SECONDARY DISCHARGE DIAGNOSES\par Diagnosis: Hypokalemia\par Assessment and Plan of Treatment: \par \par Diagnosis: Hyponatremia\par Assessment and Plan of Treatment: \par \par Diagnosis: JIMMY (acute kidney injury)\par Assessment and Plan of Treatment: \par \par Diagnosis: Severe sepsis\par Assessment and Plan of Treatment:\par Goal(s)	To get better and follow your care plan as instructed.\par \par  Follow Up:\par Care Providers for Follow up (PCP/Outpatient Provider)	Mike Mathew\par INTERNAL MEDICINE\par [address] [facility]\[place] [address]\par Phone: [phone]\par Fax: [phone]\par Established Patient\par Follow Up Time: 1 week\par \par Jim Delaney)\par Cardiovascular Disease\par [address]\[place] [address]\par Phone: [phone]\par Fax: [phone]\par Established Patient\par Follow Up Time: 1 week\par Patient's Scheduled Appointments	RADHA MADERA ; 11/11/2021 ; [facility] Card[facility]\par Discharge Diet	DASH Diet\par Activity	No restrictions\par \par  Quality Measures:\par Patient Condition	Stable\par Hospice Patient	No\par Tobacco Usage Within the Last Year	No\par Does the patient have difficulty running errands alone like visiting a doctor’s office or shopping?	No\par Does the patient have difficulty climbing stairs?	No\par Cognition: The patient has	No difficulties\par Does the patient have a principal diagnosis of ischemic stroke, hemorrhagic stroke, or TIA?	No\par Does the patient have a principal diagnosis of Acute Myocardial Infarction?	No\par Has the patient had a Percutaneous Coronary Intervention?	No\par \par  Document Complete:\par Physician Section Complete	This document is complete and the patient is ready for discharge.\par For questions about your prescriptions, please call:	[phone]\par Care Provider Seen in Hospital	Arturo Ziegler\par Gigi Dahl\par Jl Lynch\par Is this contact telephone number correct?	Yes\par \par \par \par Electronic Signatures:\par Arturo Ziegler)  (Signed 13-Sep-2021 14:04)\par 	Authored: Hospital Course, Med Reconciliation, Care Plan/Procedures, Follow Up, Quality Measures, Document Complete\par \par \par Last Updated: 13-Sep-2021 14:04 by Arturo Ziegler)\par \par \par

## 2021-09-17 NOTE — PHYSICAL EXAM
[No Acute Distress] : no acute distress [Normal Sclera/Conjunctiva] : normal sclera/conjunctiva [Normal Outer Ear/Nose] : the outer ears and nose were normal in appearance [Normal Oropharynx] : the oropharynx was normal [Supple] : supple [No Respiratory Distress] : no respiratory distress  [Clear to Auscultation] : lungs were clear to auscultation bilaterally [No Accessory Muscle Use] : no accessory muscle use [Normal Rate] : normal rate  [Regular Rhythm] : with a regular rhythm [Normal S1, S2] : normal S1 and S2 [Soft] : abdomen soft [Non Tender] : non-tender [Non-distended] : non-distended [Normal Bowel Sounds] : normal bowel sounds [No Spinal Tenderness] : no spinal tenderness [No Rash] : no rash [Normal Gait] : normal gait [Coordination Grossly Intact] : coordination grossly intact [No Focal Deficits] : no focal deficits [Normal Affect] : the affect was normal [Alert and Oriented x3] : oriented to person, place, and time [Normal Insight/Judgement] : insight and judgment were intact [de-identified] : NO THRUSH

## 2021-10-15 ENCOUNTER — APPOINTMENT (OUTPATIENT)
Dept: CARDIOLOGY | Facility: CLINIC | Age: 81
End: 2021-10-15
Payer: MEDICARE

## 2021-10-15 ENCOUNTER — NON-APPOINTMENT (OUTPATIENT)
Age: 81
End: 2021-10-15

## 2021-10-15 VITALS
SYSTOLIC BLOOD PRESSURE: 100 MMHG | HEIGHT: 66 IN | DIASTOLIC BLOOD PRESSURE: 58 MMHG | HEART RATE: 56 BPM | BODY MASS INDEX: 26.52 KG/M2 | OXYGEN SATURATION: 96 % | WEIGHT: 165 LBS

## 2021-10-15 DIAGNOSIS — J18.9 PNEUMONIA, UNSPECIFIED ORGANISM: ICD-10-CM

## 2021-10-15 PROCEDURE — 93000 ELECTROCARDIOGRAM COMPLETE: CPT

## 2021-10-15 PROCEDURE — 99214 OFFICE O/P EST MOD 30 MIN: CPT

## 2021-10-15 RX ORDER — BENZONATATE 100 MG/1
100 CAPSULE ORAL EVERY 8 HOURS
Refills: 0 | Status: DISCONTINUED | COMMUNITY
Start: 2021-09-07 | End: 2021-10-15

## 2021-10-15 RX ORDER — ALBUTEROL SULFATE 90 UG/1
108 (90 BASE) INHALANT RESPIRATORY (INHALATION)
Refills: 0 | Status: DISCONTINUED | COMMUNITY
Start: 2021-09-13 | End: 2021-10-15

## 2021-10-15 RX ORDER — LEVOFLOXACIN 500 MG/1
500 TABLET, FILM COATED ORAL
Refills: 0 | Status: DISCONTINUED | COMMUNITY
Start: 2021-09-13 | End: 2021-10-15

## 2021-10-15 RX ORDER — PITAVASTATIN CALCIUM 2.09 MG/1
2 TABLET, FILM COATED ORAL
Refills: 0 | Status: DISCONTINUED | COMMUNITY
End: 2021-10-15

## 2021-10-15 RX ORDER — PREDNISONE 5 MG/1
5 TABLET ORAL
Refills: 0 | Status: DISCONTINUED | COMMUNITY
Start: 2021-09-13 | End: 2021-10-15

## 2021-10-15 RX ORDER — PITAVASTATIN CALCIUM 4.18 MG/1
4 TABLET, FILM COATED ORAL DAILY
Refills: 0 | Status: ACTIVE | COMMUNITY

## 2021-10-15 NOTE — DISCUSSION/SUMMARY
[FreeTextEntry1] : Carotid disease followed Q 6 months with Dr Mccartney [facility] ( reports Carotid study completed March 2021 ) \par \par HTN: Controlled. Echo reviewed. \par \par HLD: Continue statin with LDL goal 70. Pt will increase Livalo as per PCP\par \par Prostate cancer: Medical management with Urologist \par \par OV 4 months

## 2021-10-15 NOTE — HISTORY OF PRESENT ILLNESS
[FreeTextEntry1] : 79 Y/O gentle,man PMH: HTN, HLD, Carotid stenosis S/P Right carotid artery stent Dr Mccartney [facility], anxiety, prostate cancer followed with Urology here in routine cardiac F/U Pt had Covid in 2/21 and experienced protracted symptoms of fatigue and brain fog. \par \par Testing:\par Nuclear Stress Pharmacologic Multiple 02.02.21 Normal SPECT Myocardial Perfusion Imaging. No scan evidence of reversible or fixed perfusion defects, Normal left ventricular contractility with an ejection fraction of 71% (Normal:\par 50% or greater), No regional wall motion abnormalities.\par \par Echo 3/31/21 NL LV Sys FX, Mild MR Reviewed with patient.\par

## 2021-11-11 ENCOUNTER — APPOINTMENT (OUTPATIENT)
Dept: CARDIOLOGY | Facility: CLINIC | Age: 81
End: 2021-11-11

## 2022-02-10 ENCOUNTER — APPOINTMENT (OUTPATIENT)
Dept: CARDIOLOGY | Facility: CLINIC | Age: 82
End: 2022-02-10
Payer: MEDICARE

## 2022-02-10 VITALS
SYSTOLIC BLOOD PRESSURE: 122 MMHG | BODY MASS INDEX: 28.28 KG/M2 | HEIGHT: 66 IN | WEIGHT: 176 LBS | HEART RATE: 60 BPM | OXYGEN SATURATION: 98 % | DIASTOLIC BLOOD PRESSURE: 60 MMHG

## 2022-02-10 PROCEDURE — 93000 ELECTROCARDIOGRAM COMPLETE: CPT

## 2022-02-10 PROCEDURE — 99214 OFFICE O/P EST MOD 30 MIN: CPT

## 2022-02-10 RX ORDER — NIACINAMIDE 500 MG
500 TABLET ORAL TWICE DAILY
Refills: 0 | Status: ACTIVE | COMMUNITY

## 2022-02-10 RX ORDER — AMLODIPINE BESYLATE 10 MG/1
10 TABLET ORAL
Qty: 90 | Refills: 3 | Status: ACTIVE | COMMUNITY

## 2022-02-10 RX ORDER — GUAIFENESIN AND CODEINE PHOSPHATE 10; 100 MG/5ML; MG/5ML
100-10 SOLUTION ORAL
Refills: 0 | Status: DISCONTINUED | COMMUNITY
Start: 2021-09-10 | End: 2022-02-10

## 2022-02-10 NOTE — HISTORY OF PRESENT ILLNESS
[FreeTextEntry1] : 82 Y/O gentleman PMH: HTN, HLD, Carotid stenosis S/P Right carotid artery stent Dr Mccartney [facility], anxiety, prostate cancer followed with Urology here in routine cardiac F/U Pt had Covid in 2/21 and experienced protracted symptoms of fatigue and brain fog. But feels improved today. \par Recent Pulmonary evaluation. Lipids are elevated despite increase Livalo dose. Pt will discuss Pralulent. \par

## 2022-02-10 NOTE — DISCUSSION/SUMMARY
[FreeTextEntry1] : Carotid disease followed Q 6 months with Dr Mccartney [facility] ( reports Carotid study completed March 2021 ) \par \par HTN: Controlled. Echo reviewed. Mild MR, MVP\par \par HLD: Continue statin with LDL goal 70. \par \par Prostate cancer: Medical management with Urologist \par \par OV 4 months

## 2022-05-09 NOTE — ED ADULT NURSE NOTE - NS ED NURSE PATIENT LEFT UNIT TIME
21:44 O-Z Flap Text: The defect edges were debeveled with a #15 scalpel blade.  Given the location of the defect, shape of the defect and the proximity to free margins an O-Z flap was deemed most appropriate.  Using a sterile surgical marker, an appropriate transposition flap was drawn incorporating the defect and placing the expected incisions within the relaxed skin tension lines where possible. The area thus outlined was incised deep to adipose tissue with a #15 scalpel blade.  The skin margins were undermined to an appropriate distance in all directions utilizing iris scissors.

## 2022-08-12 ENCOUNTER — APPOINTMENT (OUTPATIENT)
Dept: CARDIOLOGY | Facility: CLINIC | Age: 82
End: 2022-08-12

## 2022-08-12 ENCOUNTER — NON-APPOINTMENT (OUTPATIENT)
Age: 82
End: 2022-08-12

## 2022-08-12 VITALS
HEART RATE: 65 BPM | OXYGEN SATURATION: 96 % | DIASTOLIC BLOOD PRESSURE: 60 MMHG | HEIGHT: 66 IN | SYSTOLIC BLOOD PRESSURE: 102 MMHG | WEIGHT: 171 LBS | BODY MASS INDEX: 27.48 KG/M2

## 2022-08-12 PROCEDURE — 93000 ELECTROCARDIOGRAM COMPLETE: CPT

## 2022-08-12 PROCEDURE — 99214 OFFICE O/P EST MOD 30 MIN: CPT

## 2022-08-12 RX ORDER — EVOLOCUMAB 140 MG/ML
140 INJECTION, SOLUTION SUBCUTANEOUS
Qty: 2 | Refills: 0 | Status: ACTIVE | COMMUNITY
Start: 2022-06-13

## 2022-08-12 NOTE — HISTORY OF PRESENT ILLNESS
[FreeTextEntry1] : 82 Y/O gentleman PMH: HTN, HLD, Carotid stenosis S/P Right carotid artery stent Dr Mccartney [facility], anxiety, prostate cancer followed with Urology here in routine cardiac F/U Pt had Covid in 2/21 and experienced protracted symptoms of fatigue and brain fog. But feels improved today. \par Pt has started Repatha with good results.

## 2022-08-12 NOTE — DISCUSSION/SUMMARY
[FreeTextEntry1] : Carotid disease followed Q 6 months with Dr Mccartney [facility] \par HTN: Controlled. Echo reviewed. Mild MR, MVP\par \par HLD: Continue statin with LDL goal 70. He can discontinue Livalo\par \par Prostate cancer: Medical management with Urologist \par \par OV 6 months  [EKG obtained to assist in diagnosis and management of assessed problem(s)] : EKG obtained to assist in diagnosis and management of assessed problem(s)

## 2023-01-04 NOTE — ASU PREOP CHECKLIST - WEIGHT IN KG
76.2 V-Y Flap Text: The defect edges were debeveled with a #15 scalpel blade.  Given the location of the defect, shape of the defect and the proximity to free margins a V-Y flap was deemed most appropriate.  Using a sterile surgical marker, an appropriate advancement flap was drawn incorporating the defect and placing the expected incisions within the relaxed skin tension lines where possible.    The area thus outlined was incised deep to adipose tissue with a #15 scalpel blade.  The skin margins were undermined to an appropriate distance in all directions utilizing iris scissors.

## 2023-02-09 ENCOUNTER — NON-APPOINTMENT (OUTPATIENT)
Age: 83
End: 2023-02-09

## 2023-02-09 ENCOUNTER — APPOINTMENT (OUTPATIENT)
Dept: CARDIOLOGY | Facility: CLINIC | Age: 83
End: 2023-02-09
Payer: MEDICARE

## 2023-02-09 VITALS
BODY MASS INDEX: 28.06 KG/M2 | SYSTOLIC BLOOD PRESSURE: 116 MMHG | DIASTOLIC BLOOD PRESSURE: 64 MMHG | WEIGHT: 174.6 LBS | OXYGEN SATURATION: 98 % | HEIGHT: 66 IN | HEART RATE: 56 BPM

## 2023-02-09 PROCEDURE — 93000 ELECTROCARDIOGRAM COMPLETE: CPT

## 2023-02-09 PROCEDURE — 99214 OFFICE O/P EST MOD 30 MIN: CPT

## 2023-02-09 NOTE — PHYSICAL EXAM
[Normal S1, S2] : normal S1, S2 [Soft] : abdomen soft [Non Tender] : non-tender [Normal Gait] : normal gait [No Edema] : no edema [Normal] : moves all extremities, no focal deficits, normal speech [Alert and Oriented] : alert and oriented [de-identified] : Faint exp wheeze cleared with cough O2 sat RA 98% will follow with Pulmonary

## 2023-02-09 NOTE — HISTORY OF PRESENT ILLNESS
[FreeTextEntry1] : 83 y/o male PMH: HTN, HLD, palpitations many years ago maintained on Beta Blocker,  Pneumonia, COVID, Carotid stenosis S/P Right carotid artery stent Dr Mccartney [facility], anxiety, prostate cancer followed with Urology here in routine cardiac F/U \par \par Is feeling well from a cardiac standpoint \par \par Testing:\par Nuclear Stress Pharmacologic Multiple 02/02/21 Normal SPECT Myocardial Perfusion Imaging. No scan evidence of reversible or fixed perfusion defects, Normal left ventricular contractility with an ejection fraction of 71% (Normal:\par 50% or greater), No regional wall motion abnormalities.\par \par Echo 3/31/21 NL LV Sys FX, Mild MR \par

## 2023-02-09 NOTE — DISCUSSION/SUMMARY
[FreeTextEntry1] : Carotid disease S/P Right carotid artery stent followed Q 6 months with Dr Mccartney [facility] is maintained on statin and will obtain labs today \par \par HTN: Controlled\par \par Asymptomatic Bradycardia prior TSH NL will obtain sleep study \par \par HLD: Continue statin with LDL goal 70\par \par Prostate cancer: Medical management with Urologist \par \par Faint exp wheeze cleared with cough O2 sat RA 98% denies cough/fevers/illness will follow with Pulmonary \par \par OV 4 months

## 2023-02-13 ENCOUNTER — TRANSCRIPTION ENCOUNTER (OUTPATIENT)
Age: 83
End: 2023-02-13

## 2023-02-14 LAB
24R-OH-CALCIDIOL SERPL-MCNC: 60.2 PG/ML
ALBUMIN SERPL ELPH-MCNC: 4.3 G/DL
ALP BLD-CCNC: 66 U/L
ALT SERPL-CCNC: 32 U/L
ANION GAP SERPL CALC-SCNC: 10 MMOL/L
AST SERPL-CCNC: 21 U/L
BASOPHILS # BLD AUTO: 0.04 K/UL
BASOPHILS NFR BLD AUTO: 0.6 %
BILIRUB SERPL-MCNC: 0.6 MG/DL
BUN SERPL-MCNC: 20 MG/DL
CALCIUM SERPL-MCNC: 9.3 MG/DL
CHLORIDE SERPL-SCNC: 102 MMOL/L
CHOLEST SERPL-MCNC: 111 MG/DL
CO2 SERPL-SCNC: 27 MMOL/L
CREAT SERPL-MCNC: 1.17 MG/DL
EGFR: 63 ML/MIN/1.73M2
EOSINOPHIL # BLD AUTO: 0.11 K/UL
EOSINOPHIL NFR BLD AUTO: 1.5 %
GLUCOSE SERPL-MCNC: 87 MG/DL
HCT VFR BLD CALC: 47.5 %
HDLC SERPL-MCNC: 57 MG/DL
HGB BLD-MCNC: 16 G/DL
IMM GRANULOCYTES NFR BLD AUTO: 0.3 %
LDLC SERPL CALC-MCNC: 31 MG/DL
LYMPHOCYTES # BLD AUTO: 2.59 K/UL
LYMPHOCYTES NFR BLD AUTO: 36.2 %
MAGNESIUM SERPL-MCNC: 2.5 MG/DL
MAN DIFF?: NORMAL
MCHC RBC-ENTMCNC: 31.8 PG
MCHC RBC-ENTMCNC: 33.7 GM/DL
MCV RBC AUTO: 94.4 FL
MONOCYTES # BLD AUTO: 0.67 K/UL
MONOCYTES NFR BLD AUTO: 9.4 %
NEUTROPHILS # BLD AUTO: 3.72 K/UL
NEUTROPHILS NFR BLD AUTO: 52 %
NONHDLC SERPL-MCNC: 55 MG/DL
PLATELET # BLD AUTO: 205 K/UL
POTASSIUM SERPL-SCNC: 4.8 MMOL/L
PROT SERPL-MCNC: 6.6 G/DL
RBC # BLD: 5.03 M/UL
RBC # FLD: 13.2 %
SODIUM SERPL-SCNC: 139 MMOL/L
T3 SERPL-MCNC: 90 NG/DL
T4 SERPL-MCNC: 5.8 UG/DL
TRIGL SERPL-MCNC: 119 MG/DL
TSH SERPL-ACNC: 2 UIU/ML
WBC # FLD AUTO: 7.15 K/UL

## 2023-02-26 NOTE — ED STATDOCS - CLINICAL SUMMARY MEDICAL DECISION MAKING FREE TEXT BOX
Vaccine status unknown
patient presents with fever and concern for COVID exposure.  As patient is nontoxic appearing will test for COVID and d/c.  Quarantine reviewed and return precautions reviewed.

## 2023-04-04 NOTE — PATIENT PROFILE ADULT. - EXTENSIONS OF SELF_ADULT
Eyeglasses Anesthesia Type: 1% lidocaine with epinephrine and a 1:10 solution of 8.4% sodium bicarbonate

## 2023-07-03 ENCOUNTER — APPOINTMENT (OUTPATIENT)
Dept: CARDIOLOGY | Facility: CLINIC | Age: 83
End: 2023-07-03
Payer: MEDICARE

## 2023-07-03 VITALS
HEART RATE: 61 BPM | HEIGHT: 66 IN | BODY MASS INDEX: 27.97 KG/M2 | OXYGEN SATURATION: 94 % | DIASTOLIC BLOOD PRESSURE: 58 MMHG | SYSTOLIC BLOOD PRESSURE: 114 MMHG | WEIGHT: 174 LBS

## 2023-07-03 DIAGNOSIS — R00.1 BRADYCARDIA, UNSPECIFIED: ICD-10-CM

## 2023-07-03 PROCEDURE — 99214 OFFICE O/P EST MOD 30 MIN: CPT

## 2023-07-03 PROCEDURE — 93000 ELECTROCARDIOGRAM COMPLETE: CPT

## 2023-07-03 NOTE — PHYSICAL EXAM
[Normal S1, S2] : normal S1, S2 [Soft] : abdomen soft [Non Tender] : non-tender [Normal Gait] : normal gait [No Edema] : no edema [Normal] : moves all extremities, no focal deficits, normal speech [Alert and Oriented] : alert and oriented [de-identified] : Faint exp wheeze cleared with cough O2 sat RA 98% will follow with Pulmonary

## 2023-07-03 NOTE — DISCUSSION/SUMMARY
[FreeTextEntry1] : Carotid disease S/P Right carotid artery stent followed Q 6 months with Dr Mccartney [facility] is maintained on statin \par \par HTN: Controlled\par \par Asymptomatic Bradycardia prior TSH NL will obtain sleep study \par \par HLD: Continue statin with LDL goal 70\par \par \par \par \par \par OV 4 months  [EKG obtained to assist in diagnosis and management of assessed problem(s)] : EKG obtained to assist in diagnosis and management of assessed problem(s)

## 2023-07-03 NOTE — HISTORY OF PRESENT ILLNESS
[FreeTextEntry1] : 81 y/o male PMH: HTN, HLD, palpitations many years ago maintained on Beta Blocker,  Pneumonia, COVID, Carotid stenosis S/P Right carotid artery stent Dr Mccartney [facility], anxiety, prostate cancer followed with Urology here in routine cardiac F/U \par \par Is feeling well from a cardiac standpoint \par \par Testing:\par Nuclear Stress Pharmacologic Multiple 02/02/21 Normal SPECT Myocardial Perfusion Imaging. No scan evidence of reversible or fixed perfusion defects, Normal left ventricular contractility with an ejection fraction of 71% (Normal:\par 50% or greater), No regional wall motion abnormalities.\par \par Echo 3/31/21 NL LV Sys FX, Mild MR \par 7/3/23: Pt recently returned from cruise 6/23 during which he had a viral infection. Pt otherwise feels well. \par

## 2023-07-11 ENCOUNTER — OUTPATIENT (OUTPATIENT)
Dept: OUTPATIENT SERVICES | Facility: HOSPITAL | Age: 83
LOS: 1 days | Discharge: HOME | End: 2023-07-11
Payer: MEDICARE

## 2023-07-11 VITALS
SYSTOLIC BLOOD PRESSURE: 168 MMHG | RESPIRATION RATE: 21 BRPM | HEIGHT: 66 IN | DIASTOLIC BLOOD PRESSURE: 80 MMHG | OXYGEN SATURATION: 96 % | TEMPERATURE: 98 F | HEART RATE: 60 BPM | WEIGHT: 173.06 LBS

## 2023-07-11 DIAGNOSIS — I77.4 CELIAC ARTERY COMPRESSION SYNDROME: Chronic | ICD-10-CM

## 2023-07-11 DIAGNOSIS — Z98.890 OTHER SPECIFIED POSTPROCEDURAL STATES: Chronic | ICD-10-CM

## 2023-07-11 DIAGNOSIS — K44.9 DIAPHRAGMATIC HERNIA WITHOUT OBSTRUCTION OR GANGRENE: ICD-10-CM

## 2023-07-11 DIAGNOSIS — Z90.79 ACQUIRED ABSENCE OF OTHER GENITAL ORGAN(S): Chronic | ICD-10-CM

## 2023-07-11 DIAGNOSIS — R12 HEARTBURN: ICD-10-CM

## 2023-07-11 PROCEDURE — 88312 SPECIAL STAINS GROUP 1: CPT | Mod: 26

## 2023-07-11 PROCEDURE — 88305 TISSUE EXAM BY PATHOLOGIST: CPT

## 2023-07-11 PROCEDURE — 88312 SPECIAL STAINS GROUP 1: CPT

## 2023-07-11 PROCEDURE — 88305 TISSUE EXAM BY PATHOLOGIST: CPT | Mod: 26

## 2023-07-11 RX ORDER — EVOLOCUMAB 140 MG/ML
140 INJECTION, SOLUTION SUBCUTANEOUS
Refills: 0 | DISCHARGE

## 2023-07-11 RX ORDER — EZETIMIBE 10 MG/1
1 TABLET ORAL
Qty: 0 | Refills: 0 | DISCHARGE

## 2023-07-11 RX ORDER — PITAVASTATIN CALCIUM 1.04 MG/1
1 TABLET, FILM COATED ORAL
Qty: 0 | Refills: 0 | DISCHARGE

## 2023-07-11 RX ORDER — AMITRIPTYLINE HCL 25 MG
3 TABLET ORAL
Qty: 0 | Refills: 0 | DISCHARGE

## 2023-07-11 RX ORDER — IRBESARTAN 75 MG/1
1 TABLET ORAL
Qty: 0 | Refills: 0 | DISCHARGE

## 2023-07-11 RX ORDER — CLOPIDOGREL BISULFATE 75 MG/1
1 TABLET, FILM COATED ORAL
Qty: 0 | Refills: 0 | DISCHARGE

## 2023-07-11 RX ORDER — PANTOPRAZOLE SODIUM 20 MG/1
1 TABLET, DELAYED RELEASE ORAL
Qty: 0 | Refills: 0 | DISCHARGE

## 2023-07-11 RX ORDER — CHOLECALCIFEROL (VITAMIN D3) 125 MCG
1 CAPSULE ORAL
Qty: 0 | Refills: 0 | DISCHARGE

## 2023-07-11 RX ORDER — UBIDECARENONE 100 MG
200 CAPSULE ORAL
Qty: 0 | Refills: 0 | DISCHARGE

## 2023-07-11 RX ORDER — IRBESARTAN 75 MG/1
1 TABLET ORAL
Refills: 0 | DISCHARGE

## 2023-07-11 NOTE — ASU PREOP CHECKLIST - SELECT TESTS ORDERED
Results in MD note Closure 3 Information: This tab is for additional flaps and grafts above and beyond our usual structured repairs.  Please note if you enter information here it will not currently bill and you will need to add the billing information manually.

## 2023-07-11 NOTE — ASU PREOP CHECKLIST - HOW ADMINISTERED
Documentation Only:    Prior Authorization for Repatha has been approved for one year.    Approval dates:  11/11/2017 through 11/11/2018    Patient co-pay:  $0.00   See MAR for last dose taken

## 2023-07-11 NOTE — ASU PREOP CHECKLIST - TEMPERATURE IN CELSIUS (DEGREES C)
Show Aperture Variable?: Yes Consent: The patient's consent was obtained including but not limited to risks of crusting, scabbing, blistering, scarring, darker or lighter pigmentary change, recurrence, incomplete removal and infection. Detail Level: Detailed Duration Of Freeze Thaw-Cycle (Seconds): 3 Render Note In Bullet Format When Appropriate: No Post-Care Instructions: I reviewed with the patient in detail post-care instructions. Patient is to wear sunprotection, and avoid picking at any of the treated lesions. Pt may apply Vaseline to crusted or scabbing areas. Number Of Freeze-Thaw Cycles: 3 freeze-thaw cycles Spray Paint Text: The liquid nitrogen was applied to the skin utilizing a spray paint frosting technique. Medical Necessity Clause: This procedure was medically necessary because the lesions that were treated were: Medical Necessity Information: It is in your best interest to select a reason for this procedure from the list below. All of these items fulfill various CMS LCD requirements except the new and changing color options. 36.5

## 2023-07-11 NOTE — ASU PREOP CHECKLIST - LAST DOSE WITHIN LAST 24HRS
[Oriented x 3] : ~L oriented x 3 [Alert] : alert [No Visual Lymphadenopathy] : no visual  lymphadenopathy [Conjunctiva Non-injected] : conjunctiva non-injected [Well Nourished] : well nourished [No Edema] : no edema [No Clubbing] : no clubbing [No Chromhidrosis] : no chromhidrosis [No Bromhidrosis] : no bromhidrosis [FreeTextEntry3] : Exfoliative scale and hyperpigmented macules on the R hand and forearm Yes

## 2023-07-13 LAB — SURGICAL PATHOLOGY STUDY: SIGNIFICANT CHANGE UP

## 2023-07-14 DIAGNOSIS — I73.9 PERIPHERAL VASCULAR DISEASE, UNSPECIFIED: ICD-10-CM

## 2023-07-14 DIAGNOSIS — K21.9 GASTRO-ESOPHAGEAL REFLUX DISEASE WITHOUT ESOPHAGITIS: ICD-10-CM

## 2023-07-14 DIAGNOSIS — Z79.02 LONG TERM (CURRENT) USE OF ANTITHROMBOTICS/ANTIPLATELETS: ICD-10-CM

## 2023-07-14 DIAGNOSIS — K44.9 DIAPHRAGMATIC HERNIA WITHOUT OBSTRUCTION OR GANGRENE: ICD-10-CM

## 2023-07-14 DIAGNOSIS — Z85.46 PERSONAL HISTORY OF MALIGNANT NEOPLASM OF PROSTATE: ICD-10-CM

## 2023-07-14 DIAGNOSIS — Z87.891 PERSONAL HISTORY OF NICOTINE DEPENDENCE: ICD-10-CM

## 2023-07-14 DIAGNOSIS — K31.89 OTHER DISEASES OF STOMACH AND DUODENUM: ICD-10-CM

## 2023-07-14 DIAGNOSIS — I10 ESSENTIAL (PRIMARY) HYPERTENSION: ICD-10-CM

## 2023-07-14 DIAGNOSIS — Z88.2 ALLERGY STATUS TO SULFONAMIDES: ICD-10-CM

## 2023-07-19 ENCOUNTER — APPOINTMENT (OUTPATIENT)
Dept: CARDIOLOGY | Facility: CLINIC | Age: 83
End: 2023-07-19
Payer: MEDICARE

## 2023-07-19 PROCEDURE — 93306 TTE W/DOPPLER COMPLETE: CPT

## 2023-09-01 ENCOUNTER — NON-APPOINTMENT (OUTPATIENT)
Age: 83
End: 2023-09-01

## 2023-10-09 NOTE — ASU PREOP CHECKLIST - PATIENT PROBLEMS/NEEDS
Ambulatory Care Management Note    Date/Time:  10/9/2023 11:50 AM    This patient was received as a referral from Daily assignment. Ambulatory Care Manager outreached to patient today to offer care management services. Introduction to self and role of care manager provided. Patient declined care management services at this time. No follow up call scheduled at this time. Patient has Ambulatory Care Manager's contact number for for any questions or concerns.    Ambulatory Care Management Note
Patient expressed no known problems or needs

## 2023-12-05 NOTE — DISCHARGE NOTE PROVIDER - NSDCQMERRANDS_GEN_ALL_CORE
NOTIFICATION OF INPATIENT ADMISSION   AUTHORIZATION REQUEST   SERVICING FACILITY:   [address] [place] [address]  Tax ID: [id number]  NPI: [id number] ATTENDING PROVIDER:  Attending Name and NPI#: Fide Gonzales Md [[id number]]  Address: [address] [place] [address]  Phone: [phone]     ADMISSION INFORMATION:  Place of Service: Inpatient [address]  Place of Service Code: 21  Inpatient Admission Date/Time: 12/4/23  3:47 PM  Discharge Date/Time: No discharge date for patient encounter. Admitting Diagnosis Code/Description:  Chest pain [R07.9]  CHF, acute on chronic ([address]) [I50.9]     UTILIZATION REVIEW CONTACT:  [facility] Utilization   Network Utilization Review Department  Phone: [phone]  Fax [phone]  Email: [email or web address]  Contact for approvals/pending authorizations, clinical reviews, and discharge. PHYSICIAN ADVISORY SERVICES:  Medical Necessity Denial & Iznj-jr-Agfx Review  Phone: [phone]  Fax: [phone]  Email: [email or web address]     DISCHARGE SUPPORT TEAM:  For Patients Discharge Needs & Updates  Phone: [phone] opt. 2 Fax: [phone]  Email: [email or web address] No

## 2024-02-29 ENCOUNTER — NON-APPOINTMENT (OUTPATIENT)
Age: 84
End: 2024-02-29

## 2024-02-29 ENCOUNTER — APPOINTMENT (OUTPATIENT)
Dept: CARDIOLOGY | Facility: CLINIC | Age: 84
End: 2024-02-29
Payer: MEDICARE

## 2024-02-29 VITALS
DIASTOLIC BLOOD PRESSURE: 60 MMHG | OXYGEN SATURATION: 97 % | SYSTOLIC BLOOD PRESSURE: 138 MMHG | WEIGHT: 176 LBS | HEIGHT: 66 IN | BODY MASS INDEX: 28.28 KG/M2 | HEART RATE: 55 BPM

## 2024-02-29 DIAGNOSIS — I10 ESSENTIAL (PRIMARY) HYPERTENSION: ICD-10-CM

## 2024-02-29 DIAGNOSIS — E78.5 HYPERLIPIDEMIA, UNSPECIFIED: ICD-10-CM

## 2024-02-29 PROCEDURE — G2211 COMPLEX E/M VISIT ADD ON: CPT

## 2024-02-29 PROCEDURE — 93000 ELECTROCARDIOGRAM COMPLETE: CPT

## 2024-02-29 PROCEDURE — 99214 OFFICE O/P EST MOD 30 MIN: CPT

## 2024-02-29 RX ORDER — FAMOTIDINE 10 MG/1
10 TABLET, FILM COATED ORAL DAILY
Refills: 0 | Status: ACTIVE | COMMUNITY

## 2024-02-29 RX ORDER — PANTOPRAZOLE SODIUM 40 MG/1
40 GRANULE, DELAYED RELEASE ORAL DAILY
Refills: 0 | Status: DISCONTINUED | COMMUNITY
End: 2024-02-29

## 2024-02-29 NOTE — PHYSICAL EXAM
[Normal S1, S2] : normal S1, S2 [Soft] : abdomen soft [Non Tender] : non-tender [Normal Gait] : normal gait [No Edema] : no edema [Normal] : moves all extremities, no focal deficits, normal speech [Alert and Oriented] : alert and oriented [de-identified] : Faint exp wheeze cleared with cough O2 sat RA 98% will follow with Pulmonary

## 2024-02-29 NOTE — HISTORY OF PRESENT ILLNESS
[FreeTextEntry1] : 84 y/o male PMH: HTN, HLD, palpitations many years ago maintained on Beta Blocker,  Pneumonia, COVID, Carotid stenosis S/P Right carotid artery stent Dr Mccartney [facility], anxiety, prostate cancer followed with Urology here in routine cardiac F/U   Is feeling well from a cardiac standpoint, without chest pain or shortness of breath  Testing: Nuclear Stress Pharmacologic Multiple 02/02/21 Normal SPECT Myocardial Perfusion Imaging. No scan evidence of reversible or fixed perfusion defects, Normal left ventricular contractility with an ejection fraction of 71% (Normal: 50% or greater), No regional wall motion abnormalities.  Echo  NL LV Sys FX, Mild MR

## 2024-08-23 ENCOUNTER — APPOINTMENT (OUTPATIENT)
Dept: CARDIOLOGY | Facility: CLINIC | Age: 84
End: 2024-08-23
Payer: MEDICARE

## 2024-08-23 VITALS
HEART RATE: 51 BPM | HEIGHT: 66 IN | OXYGEN SATURATION: 95 % | SYSTOLIC BLOOD PRESSURE: 106 MMHG | BODY MASS INDEX: 27.48 KG/M2 | DIASTOLIC BLOOD PRESSURE: 62 MMHG | WEIGHT: 171 LBS

## 2024-08-23 DIAGNOSIS — I10 ESSENTIAL (PRIMARY) HYPERTENSION: ICD-10-CM

## 2024-08-23 DIAGNOSIS — E78.5 HYPERLIPIDEMIA, UNSPECIFIED: ICD-10-CM

## 2024-08-23 DIAGNOSIS — R00.1 BRADYCARDIA, UNSPECIFIED: ICD-10-CM

## 2024-08-23 PROCEDURE — 99214 OFFICE O/P EST MOD 30 MIN: CPT

## 2024-08-23 PROCEDURE — G2211 COMPLEX E/M VISIT ADD ON: CPT

## 2024-08-23 PROCEDURE — 93000 ELECTROCARDIOGRAM COMPLETE: CPT

## 2024-08-23 NOTE — HISTORY OF PRESENT ILLNESS
[FreeTextEntry1] : 82 y/o male PMH: HTN, HLD, palpitations many years ago maintained on Beta Blocker,  Pneumonia, COVID, Carotid stenosis S/P Right carotid artery stent Dr Mccartney [facility], anxiety, prostate cancer followed with Urology here in routine cardiac F/U   Is feeling well from a cardiac standpoint, without chest pain or shortness of breath. He is concerned about SE from Repatha. He will follow up with Dr. Mathew. Discussed Leqvio.   Testing: Nuclear Stress Pharmacologic Multiple 02/02/21 Normal SPECT Myocardial Perfusion Imaging. No scan evidence of reversible or fixed perfusion defects, Normal left ventricular contractility with an ejection fraction of 71% (Normal: 50% or greater), No regional wall motion abnormalities.  Echo  NL LV Sys FX, Mild MR

## 2024-08-23 NOTE — PHYSICAL EXAM
[Normal S1, S2] : normal S1, S2 [Soft] : abdomen soft [Non Tender] : non-tender [Normal Gait] : normal gait [No Edema] : no edema [Normal] : moves all extremities, no focal deficits, normal speech [Alert and Oriented] : alert and oriented [de-identified] : Faint exp wheeze cleared with cough O2 sat RA 98% will follow with Pulmonary

## 2024-08-23 NOTE — DISCUSSION/SUMMARY
[FreeTextEntry1] : Carotid disease S/P Right carotid artery stent followed Q 6 months with Dr Mccartney [facility] is maintained on statin   HTN: Controlled  Asymptomatic Bradycardia prior TSH NL will obtain sleep study   HLD: Continue Repatha.   OV 4 months  [EKG obtained to assist in diagnosis and management of assessed problem(s)] : EKG obtained to assist in diagnosis and management of assessed problem(s)

## 2024-12-05 ENCOUNTER — NON-APPOINTMENT (OUTPATIENT)
Age: 84
End: 2024-12-05

## 2024-12-18 NOTE — PATIENT PROFILE ADULT - NSPROPTRIGHTREPNAME_GEN_A__NUR
US results reviewed with Dr Vann, plan patient will need MRI liver and AFP.  Called patient to discuss results and plan per Dr Vann:  Spoke to patients mother discussed need for MRI, she was agreeable with plan.  
same as above

## 2025-02-28 ENCOUNTER — NON-APPOINTMENT (OUTPATIENT)
Age: 85
End: 2025-02-28

## 2025-02-28 ENCOUNTER — APPOINTMENT (OUTPATIENT)
Dept: CARDIOLOGY | Facility: CLINIC | Age: 85
End: 2025-02-28
Payer: MEDICARE

## 2025-02-28 VITALS
WEIGHT: 174 LBS | SYSTOLIC BLOOD PRESSURE: 120 MMHG | HEART RATE: 70 BPM | OXYGEN SATURATION: 93 % | BODY MASS INDEX: 27.97 KG/M2 | HEIGHT: 66 IN | DIASTOLIC BLOOD PRESSURE: 58 MMHG

## 2025-02-28 DIAGNOSIS — E78.5 HYPERLIPIDEMIA, UNSPECIFIED: ICD-10-CM

## 2025-02-28 DIAGNOSIS — R00.1 BRADYCARDIA, UNSPECIFIED: ICD-10-CM

## 2025-02-28 DIAGNOSIS — I10 ESSENTIAL (PRIMARY) HYPERTENSION: ICD-10-CM

## 2025-02-28 PROCEDURE — G2211 COMPLEX E/M VISIT ADD ON: CPT

## 2025-02-28 PROCEDURE — 99214 OFFICE O/P EST MOD 30 MIN: CPT

## 2025-02-28 PROCEDURE — 93000 ELECTROCARDIOGRAM COMPLETE: CPT

## 2025-02-28 RX ORDER — PANTOPRAZOLE 40 MG/1
40 TABLET, DELAYED RELEASE ORAL DAILY
Refills: 0 | Status: ACTIVE | COMMUNITY

## 2025-08-16 ENCOUNTER — NON-APPOINTMENT (OUTPATIENT)
Age: 85
End: 2025-08-16

## 2025-08-18 ENCOUNTER — APPOINTMENT (OUTPATIENT)
Dept: CARDIOLOGY | Facility: CLINIC | Age: 85
End: 2025-08-18
Payer: MEDICARE

## 2025-08-18 VITALS
BODY MASS INDEX: 27.32 KG/M2 | HEART RATE: 58 BPM | DIASTOLIC BLOOD PRESSURE: 50 MMHG | HEIGHT: 66 IN | SYSTOLIC BLOOD PRESSURE: 120 MMHG | OXYGEN SATURATION: 96 % | WEIGHT: 170 LBS

## 2025-08-18 DIAGNOSIS — I10 ESSENTIAL (PRIMARY) HYPERTENSION: ICD-10-CM

## 2025-08-18 DIAGNOSIS — E78.5 HYPERLIPIDEMIA, UNSPECIFIED: ICD-10-CM

## 2025-08-18 PROCEDURE — 93000 ELECTROCARDIOGRAM COMPLETE: CPT

## 2025-08-18 PROCEDURE — 99214 OFFICE O/P EST MOD 30 MIN: CPT

## 2025-08-18 PROCEDURE — G2211 COMPLEX E/M VISIT ADD ON: CPT

## 2025-08-18 RX ORDER — NEBIVOLOL 5 MG/1
5 TABLET ORAL DAILY
Refills: 0 | Status: ACTIVE | COMMUNITY

## 2025-08-18 RX ORDER — PREGABALIN 25 MG/1
25 CAPSULE ORAL TWICE DAILY
Refills: 0 | Status: ACTIVE | COMMUNITY